# Patient Record
Sex: FEMALE | Race: BLACK OR AFRICAN AMERICAN | NOT HISPANIC OR LATINO | URBAN - METROPOLITAN AREA
[De-identification: names, ages, dates, MRNs, and addresses within clinical notes are randomized per-mention and may not be internally consistent; named-entity substitution may affect disease eponyms.]

---

## 2024-10-25 ENCOUNTER — INPATIENT (INPATIENT)
Facility: HOSPITAL | Age: 34
LOS: 4 days | Discharge: HOME HEALTH | End: 2024-10-30
Attending: STUDENT IN AN ORGANIZED HEALTH CARE EDUCATION/TRAINING PROGRAM | Admitting: STUDENT IN AN ORGANIZED HEALTH CARE EDUCATION/TRAINING PROGRAM
Payer: COMMERCIAL

## 2024-10-25 VITALS
OXYGEN SATURATION: 95 % | SYSTOLIC BLOOD PRESSURE: 117 MMHG | HEIGHT: 64 IN | HEART RATE: 103 BPM | RESPIRATION RATE: 20 BRPM | WEIGHT: 220.02 LBS | DIASTOLIC BLOOD PRESSURE: 85 MMHG | TEMPERATURE: 99 F

## 2024-10-25 DIAGNOSIS — J18.9 PNEUMONIA, UNSPECIFIED ORGANISM: ICD-10-CM

## 2024-10-25 DIAGNOSIS — E66.812 OBESITY, CLASS 2: ICD-10-CM

## 2024-10-25 DIAGNOSIS — J96.01 ACUTE RESPIRATORY FAILURE WITH HYPOXIA: ICD-10-CM

## 2024-10-25 LAB
ALBUMIN SERPL ELPH-MCNC: 3.2 G/DL — LOW (ref 3.3–5)
ALP SERPL-CCNC: 66 U/L — SIGNIFICANT CHANGE UP (ref 40–120)
ALT FLD-CCNC: 16 U/L — SIGNIFICANT CHANGE UP (ref 12–78)
ANION GAP SERPL CALC-SCNC: 8 MMOL/L — SIGNIFICANT CHANGE UP (ref 5–17)
ANISOCYTOSIS BLD QL: SLIGHT — SIGNIFICANT CHANGE UP
APTT BLD: 24.9 SEC — SIGNIFICANT CHANGE UP (ref 24.5–35.6)
AST SERPL-CCNC: 25 U/L — SIGNIFICANT CHANGE UP (ref 15–37)
BASOPHILS # BLD AUTO: 0.07 K/UL — SIGNIFICANT CHANGE UP (ref 0–0.2)
BASOPHILS NFR BLD AUTO: 0.6 % — SIGNIFICANT CHANGE UP (ref 0–2)
BILIRUB SERPL-MCNC: 0.4 MG/DL — SIGNIFICANT CHANGE UP (ref 0.2–1.2)
BUN SERPL-MCNC: 6 MG/DL — LOW (ref 7–23)
CALCIUM SERPL-MCNC: 9 MG/DL — SIGNIFICANT CHANGE UP (ref 8.5–10.1)
CHLORIDE SERPL-SCNC: 109 MMOL/L — HIGH (ref 96–108)
CO2 SERPL-SCNC: 22 MMOL/L — SIGNIFICANT CHANGE UP (ref 22–31)
CREAT SERPL-MCNC: 0.72 MG/DL — SIGNIFICANT CHANGE UP (ref 0.5–1.3)
D DIMER BLD IA.RAPID-MCNC: 339 NG/ML DDU — HIGH
EGFR: 111 ML/MIN/1.73M2 — SIGNIFICANT CHANGE UP
EOSINOPHIL # BLD AUTO: 0.16 K/UL — SIGNIFICANT CHANGE UP (ref 0–0.5)
EOSINOPHIL NFR BLD AUTO: 1.4 % — SIGNIFICANT CHANGE UP (ref 0–6)
FLUAV AG NPH QL: SIGNIFICANT CHANGE UP
FLUBV AG NPH QL: SIGNIFICANT CHANGE UP
GIANT PLATELETS BLD QL SMEAR: PRESENT — SIGNIFICANT CHANGE UP
GLUCOSE SERPL-MCNC: 97 MG/DL — SIGNIFICANT CHANGE UP (ref 70–99)
HCG SERPL-ACNC: <1 MIU/ML — SIGNIFICANT CHANGE UP
HCT VFR BLD CALC: 41.3 % — SIGNIFICANT CHANGE UP (ref 34.5–45)
HGB BLD-MCNC: 14.3 G/DL — SIGNIFICANT CHANGE UP (ref 11.5–15.5)
IMM GRANULOCYTES NFR BLD AUTO: 0.5 % — SIGNIFICANT CHANGE UP (ref 0–0.9)
INR BLD: 1.1 RATIO — SIGNIFICANT CHANGE UP (ref 0.85–1.16)
LG PLATELETS BLD QL AUTO: SLIGHT — SIGNIFICANT CHANGE UP
LYMPHOCYTES # BLD AUTO: 2.97 K/UL — SIGNIFICANT CHANGE UP (ref 1–3.3)
LYMPHOCYTES # BLD AUTO: 26.5 % — SIGNIFICANT CHANGE UP (ref 13–44)
MANUAL SMEAR VERIFICATION: SIGNIFICANT CHANGE UP
MCHC RBC-ENTMCNC: 23.6 PG — LOW (ref 27–34)
MCHC RBC-ENTMCNC: 34.6 G/DL — SIGNIFICANT CHANGE UP (ref 32–36)
MCV RBC AUTO: 68 FL — LOW (ref 80–100)
MICROCYTES BLD QL: SIGNIFICANT CHANGE UP
MONOCYTES # BLD AUTO: 0.87 K/UL — SIGNIFICANT CHANGE UP (ref 0–0.9)
MONOCYTES NFR BLD AUTO: 7.8 % — SIGNIFICANT CHANGE UP (ref 2–14)
NEUTROPHILS # BLD AUTO: 7.07 K/UL — SIGNIFICANT CHANGE UP (ref 1.8–7.4)
NEUTROPHILS NFR BLD AUTO: 63.2 % — SIGNIFICANT CHANGE UP (ref 43–77)
NRBC # BLD: 0 /100 WBCS — SIGNIFICANT CHANGE UP (ref 0–0)
NT-PROBNP SERPL-SCNC: 44 PG/ML — SIGNIFICANT CHANGE UP (ref 0–125)
PLAT MORPH BLD: ABNORMAL
PLATELET # BLD AUTO: 346 K/UL — SIGNIFICANT CHANGE UP (ref 150–400)
POTASSIUM SERPL-MCNC: 3.9 MMOL/L — SIGNIFICANT CHANGE UP (ref 3.5–5.3)
POTASSIUM SERPL-SCNC: 3.9 MMOL/L — SIGNIFICANT CHANGE UP (ref 3.5–5.3)
PROT SERPL-MCNC: 7.9 GM/DL — SIGNIFICANT CHANGE UP (ref 6–8.3)
PROTHROM AB SERPL-ACNC: 12.7 SEC — SIGNIFICANT CHANGE UP (ref 9.9–13.4)
RAPID RVP RESULT: SIGNIFICANT CHANGE UP
RBC # BLD: 6.07 M/UL — HIGH (ref 3.8–5.2)
RBC # FLD: 14.6 % — HIGH (ref 10.3–14.5)
RBC BLD AUTO: ABNORMAL
SARS-COV-2 RNA SPEC QL NAA+PROBE: SIGNIFICANT CHANGE UP
SARS-COV-2 RNA SPEC QL NAA+PROBE: SIGNIFICANT CHANGE UP
SODIUM SERPL-SCNC: 139 MMOL/L — SIGNIFICANT CHANGE UP (ref 135–145)
TROPONIN I, HIGH SENSITIVITY RESULT: 3 NG/L — SIGNIFICANT CHANGE UP
WBC # BLD: 11.2 K/UL — HIGH (ref 3.8–10.5)
WBC # FLD AUTO: 11.2 K/UL — HIGH (ref 3.8–10.5)

## 2024-10-25 PROCEDURE — 99223 1ST HOSP IP/OBS HIGH 75: CPT

## 2024-10-25 PROCEDURE — 71046 X-RAY EXAM CHEST 2 VIEWS: CPT | Mod: 26

## 2024-10-25 PROCEDURE — 71275 CT ANGIOGRAPHY CHEST: CPT | Mod: 26,MC

## 2024-10-25 PROCEDURE — 99285 EMERGENCY DEPT VISIT HI MDM: CPT

## 2024-10-25 PROCEDURE — 93010 ELECTROCARDIOGRAM REPORT: CPT

## 2024-10-25 RX ORDER — ACETAMINOPHEN 500 MG
1000 TABLET ORAL ONCE
Refills: 0 | Status: COMPLETED | OUTPATIENT
Start: 2024-10-25 | End: 2024-10-25

## 2024-10-25 RX ORDER — AZITHROMYCIN DIHYDRATE 200 MG/5ML
500 POWDER, FOR SUSPENSION ORAL ONCE
Refills: 0 | Status: DISCONTINUED | OUTPATIENT
Start: 2024-10-25 | End: 2024-10-25

## 2024-10-25 RX ORDER — DOXYCYCLINE HYCLATE 100 MG/1
100 TABLET, FILM COATED ORAL EVERY 12 HOURS
Refills: 0 | Status: DISCONTINUED | OUTPATIENT
Start: 2024-10-26 | End: 2024-10-28

## 2024-10-25 RX ORDER — ACETAMINOPHEN 500 MG
650 TABLET ORAL EVERY 6 HOURS
Refills: 0 | Status: DISCONTINUED | OUTPATIENT
Start: 2024-10-25 | End: 2024-10-30

## 2024-10-25 RX ORDER — MELATONIN 5 MG
3 TABLET ORAL AT BEDTIME
Refills: 0 | Status: DISCONTINUED | OUTPATIENT
Start: 2024-10-25 | End: 2024-10-30

## 2024-10-25 RX ORDER — ENOXAPARIN SODIUM 40MG/0.4ML
40 SYRINGE (ML) SUBCUTANEOUS EVERY 24 HOURS
Refills: 0 | Status: DISCONTINUED | OUTPATIENT
Start: 2024-10-25 | End: 2024-10-29

## 2024-10-25 RX ORDER — CEFTRIAXONE SODIUM 10 G
1000 VIAL (EA) INJECTION ONCE
Refills: 0 | Status: COMPLETED | OUTPATIENT
Start: 2024-10-25 | End: 2024-10-25

## 2024-10-25 RX ORDER — IPRATROPIUM BROMIDE AND ALBUTEROL SULFATE .5; 2.5 MG/3ML; MG/3ML
3 SOLUTION RESPIRATORY (INHALATION) EVERY 6 HOURS
Refills: 0 | Status: DISCONTINUED | OUTPATIENT
Start: 2024-10-25 | End: 2024-10-30

## 2024-10-25 RX ORDER — AZITHROMYCIN DIHYDRATE 200 MG/5ML
500 POWDER, FOR SUSPENSION ORAL ONCE
Refills: 0 | Status: COMPLETED | OUTPATIENT
Start: 2024-10-25 | End: 2024-10-25

## 2024-10-25 RX ORDER — DOXYCYCLINE HYCLATE 100 MG/1
100 TABLET, FILM COATED ORAL ONCE
Refills: 0 | Status: COMPLETED | OUTPATIENT
Start: 2024-10-25 | End: 2024-10-25

## 2024-10-25 RX ORDER — CEFTRIAXONE SODIUM 10 G
1000 VIAL (EA) INJECTION EVERY 24 HOURS
Refills: 0 | Status: DISCONTINUED | OUTPATIENT
Start: 2024-10-26 | End: 2024-10-30

## 2024-10-25 RX ORDER — DOXYCYCLINE HYCLATE 100 MG/1
TABLET, FILM COATED ORAL
Refills: 0 | Status: DISCONTINUED | OUTPATIENT
Start: 2024-10-25 | End: 2024-10-28

## 2024-10-25 RX ADMIN — Medication 1000 MILLIGRAM(S): at 18:15

## 2024-10-25 RX ADMIN — Medication 1000 MILLIGRAM(S): at 17:09

## 2024-10-25 RX ADMIN — AZITHROMYCIN DIHYDRATE 255 MILLIGRAM(S): 200 POWDER, FOR SUSPENSION ORAL at 16:57

## 2024-10-25 RX ADMIN — Medication 40 MILLIGRAM(S): at 23:17

## 2024-10-25 RX ADMIN — Medication 100 MILLIGRAM(S): at 16:21

## 2024-10-25 RX ADMIN — Medication 650 MILLIGRAM(S): at 22:10

## 2024-10-25 RX ADMIN — Medication 650 MILLIGRAM(S): at 21:40

## 2024-10-25 RX ADMIN — DOXYCYCLINE HYCLATE 100 MILLIGRAM(S): 100 TABLET, FILM COATED ORAL at 19:17

## 2024-10-25 RX ADMIN — Medication 400 MILLIGRAM(S): at 13:59

## 2024-10-25 NOTE — ED ADULT TRIAGE NOTE - CHIEF COMPLAINT QUOTE
pt c/o chest pain with difficulty breathing x 1 week. dyspnea on exertion. + sore throat seen by pmd and urgent care with negative results. denies fever, chills, body aches. given rx: doxycycline 100mg and amoxicillin 500 yesterday with no relief. denies pmh. o2 sat 89% on room air in triage placed on 022LNC increased to 95%.

## 2024-10-25 NOTE — CHART NOTE - NSCHARTNOTEFT_GEN_A_CORE
patient with burning sensation in left ac heplock while receiving Zithromax infusion   no dyspnea , chest tightness, sensation of airway fullness , difficulty swallowing, pruritus , rash , fever ,diaphoresis   infusion held and line flushed x 2 with ns   burning stopped after infusion held and did not reoccur with ns flushes of infusion  of doxycycline   heplock removed and re-sited to right arm   will discontinue Zithromax and start doxycyline

## 2024-10-25 NOTE — H&P ADULT - PROBLEM SELECTOR PLAN 2
SOB, hypoxia  CTA chest  ( I personally review) with no PE. Bilateral widespread GGO and airspace opacification  No recent hospitalization or antibiotics use  Continue with ceftriaxone and azithromycin  follow up blood culture result  Check RVP, urine legionella/strep pneumo Ag, mycoplasma IgM  closely monitor hemodynamic and respiratory status

## 2024-10-25 NOTE — H&P ADULT - NSHPPHYSICALEXAM_GEN_ALL_CORE
CONSTITUTIONAL: alert and cooperative, mild respiratory distress  EYES: PERRL, no scleral icterus  ENT: Mucosa moist, tongue normal  NECK: Neck supple, trachea midline, non-tender  CARDIAC: Normal S1 and S2. Regular rate and rhythms. No Pedal edema  LUNGS: Equal air entry both lungs. Bilateral rales +. Increase respiratory effort.   ABDOMEN: Soft, nondistended, nontender. No guarding or rebound tenderness. No hepatomegaly or splenomegaly. Bowel sound normal.   MUSCULOSKELETAL: Normocephalic, atraumatic. No significant deformity or joint abnormality  NEUROLOGICAL: No gross motor or sensory deficits  SKIN: no lesions or eruptions. Normal turgor  PSYCHIATRIC: A&O x 3, appropriate mood and affect

## 2024-10-25 NOTE — ED ADULT NURSE REASSESSMENT NOTE - NS ED NURSE REASSESS COMMENT FT1
Patient noted with a burning sensation at IV site, as per pt stated that it started when the azithromycin IV started same stopped. ED MD came in bedside no swelling to site IV flushing without difficulties noted with blood return and no pain while flushing with NS. Inpatient PA page recommendations made to d/c azithromycin.

## 2024-10-25 NOTE — PATIENT PROFILE ADULT - FALL HARM RISK - HARM RISK INTERVENTIONS
Assistance with ambulation/Assistance OOB with selected safe patient handling equipment/Communicate Risk of Fall with Harm to all staff/Discuss with provider need for PT consult/Monitor gait and stability/Reinforce activity limits and safety measures with patient and family/Tailored Fall Risk Interventions/Visual Cue: Yellow wristband and red socks/Bed in lowest position, wheels locked, appropriate side rails in place/Call bell, personal items and telephone in reach/Instruct patient to call for assistance before getting out of bed or chair/Non-slip footwear when patient is out of bed/[place] to call system/Physically safe environment - no spills, clutter or unnecessary equipment/Purposeful Proactive Rounding/Room/bathroom lighting operational, light cord in reach

## 2024-10-25 NOTE — ED ADULT NURSE NOTE - OBJECTIVE STATEMENT
Patient alert and verbally responsive, came in due to chest pains  with difficulty breathing x 1 week with a  sore throat. Went to urgent care and was  given doxycycline 100mg and amoxicillin 500 yesterday with no relief. denies pmh.

## 2024-10-25 NOTE — ED PROVIDER NOTE - CARE PLAN
1 Principal Discharge DX:	Dyspnea   Principal Discharge DX:	Multifocal pneumonia  Secondary Diagnosis:	Hypoxia

## 2024-10-25 NOTE — ED PROVIDER NOTE - ATTENDING APP SHARED VISIT CONTRIBUTION OF CARE
33yo female with no pmh presenting with worsening sob over past 5 days.  Saw doctor tuesday who prescribed abx for possible pna.  Patient with worsening exertional dyspnea over past few days associated with chest pain/ tightness worse with inspiration.  No fever.  + Dry cough.  Not worse with lying flat.  No edema.  No ocp use.  No hx dvt/ pe, early cardiac dz in family.  ECG sinus tachy.  Will eval pna, pe, viral syndrome, acs, chf.  Plan labs, xr, reassess.  Currently 86% on RA, will need admission.

## 2024-10-25 NOTE — ED ADULT NURSE REASSESSMENT NOTE - NS ED NURSE REASSESS COMMENT FT1
Received report from ANTONY Solano. Identified pt and introduced myself as RN. Pt is a&ox3. Cardiac monitor maintained. Nasal cannula in place at 4L. IV access is patent. Safety and fall precautions in place.

## 2024-10-25 NOTE — ED PROVIDER NOTE - PHYSICAL EXAMINATION
GEN: Awake, alert, interactive, NAD.  HEAD AND NECK: NC/AT. Airway patent. Neck supple.   EYES:  Clear b/l.    ENT: Moist mucus membranes.   CARDIAC: Regular rate, regular rhythm. No evident pedal edema.    RESP/CHEST: Normal respiratory effort with no use of accessory muscles or retractions. Clear throughout on auscultation.  ABD: soft, non-distended, non-tender. No rebound, no guarding.   BACK: No midline spinal TTP. No CVAT.   EXTREMITIES: Moving all extremities with no apparent deformities. No calf tenderness.   SKIN: Warm, dry, intact normal color. No rash.   NEURO: AOx3, CN II-XII grossly intact, no focal deficits.   PSYCH: Appropriate mood and affect.

## 2024-10-25 NOTE — H&P ADULT - ASSESSMENT
34 years old female with no significant medical history present to ED with complain of progressively worsened SOB for 5 days. Reported sore throat and chest congestion. No  fever or chills. No known sick contact.   Tachycardic, hypoxic to 89% at RA, improved with NC. WBC 11.2, plt 346, Cr 0.72, hsTnT 3, proBNP 44. Flu/COVID negative. CTA chest with no PE. Bilateral widespread GGO and airspace opacification

## 2024-10-25 NOTE — H&P ADULT - HISTORY OF PRESENT ILLNESS
34 years old female with no significant medical history present to ED with complain of progressively worsened SOB for 5 days. Reported sore throat and chest congestion. No fever or chills. No known sick contact.   Tachycardic, hypoxic to 89% at RA, improved with NC. WBC 11.2, plt 346, Cr 0.72, hsTnT 3, proBNP 44. Flu/COVID negative. CTA chest with no PE. Bilateral widespread GGO and airspace opacitifation.     SH: no toxic habits

## 2024-10-25 NOTE — H&P ADULT - PROBLEM SELECTOR PLAN 1
present with worsening SOB  Hypoxic to 89% at RA, improved with NC  leukocytosis +, negative flu/COVID. Normal proBNP  CTA chest  ( I personally review) with no PE. Bilateral widespread GGO and airspace opacification  due to pneumonia  Continue ceftriaxone and azithromycin  Closely monitor respiratory status, wean off NC as tolerate  duoneb prn

## 2024-10-25 NOTE — ED PROVIDER NOTE - CLINICAL SUMMARY MEDICAL DECISION MAKING FREE TEXT BOX
33 y/o female with no PMH here with difficulty breathing x 5 days with chest pain when taking deep breath. Noted to be hypoxic in triage.   Ddx include but not limited to acs, PE, ptx, PNA.   Will obtain cardiac labs including d-dimer, cxr, ekg, flu/covid. 35 y/o female with no PMH here with difficulty breathing x 5 days with chest pain when taking deep breath. Noted to be hypoxic in triage.   Ddx include but not limited to acs, PE, ptx, PNA.   Will obtain cardiac labs including d-dimer, cxr, ekg, flu/covid.    labs reviewed d-dimer mildly elevated 339. Will obtain cta r/o PE. Flu/covid negative.   CTA chest: NO PE. Low lung volumes with extensive bilateral groundglass and airspace   opacification may represent pulmonary edema and/or infection.  Scattered cystic areas throughout the pulmonary parenchyma    Will treat with rocephin and azithromycin for pna. Given pt is hypoxic 86 on RA will admit to the hospital.

## 2024-10-25 NOTE — ED ADULT TRIAGE NOTE - TEMP AT ED ARRIVAL (C)
Left msg on voicemail that prescription has been sent to pharmacy however she is due for follow up office visit and to call to schedule. 35

## 2024-10-25 NOTE — ED PROVIDER NOTE - OBJECTIVE STATEMENT
35 y/o female with no PMH here with difficulty breathing x 5 days. Pt reports having shortness of breath and chest pain when taking a deep breath. Dyspnea is worse with exertion. Pt saw PMD 3 days ago and had negative chest xray and was given abx which she took with no relief. Denies fever, chills. Reports mild dry cough. Denies nausea, vomiting, abdominal pain, LE swelling, LE pain,  recent travel, sick contact, OCP use. Pt denies family history of heart disease or blood clots. Pt denies smoking, drinking, drugs.

## 2024-10-26 LAB
ALBUMIN SERPL ELPH-MCNC: 2.9 G/DL — LOW (ref 3.3–5)
ALP SERPL-CCNC: 64 U/L — SIGNIFICANT CHANGE UP (ref 40–120)
ALT FLD-CCNC: 15 U/L — SIGNIFICANT CHANGE UP (ref 12–78)
ANION GAP SERPL CALC-SCNC: 10 MMOL/L — SIGNIFICANT CHANGE UP (ref 5–17)
AST SERPL-CCNC: 27 U/L — SIGNIFICANT CHANGE UP (ref 15–37)
BILIRUB SERPL-MCNC: 0.4 MG/DL — SIGNIFICANT CHANGE UP (ref 0.2–1.2)
BUN SERPL-MCNC: 5 MG/DL — LOW (ref 7–23)
CALCIUM SERPL-MCNC: 8.9 MG/DL — SIGNIFICANT CHANGE UP (ref 8.5–10.1)
CHLORIDE SERPL-SCNC: 107 MMOL/L — SIGNIFICANT CHANGE UP (ref 96–108)
CO2 SERPL-SCNC: 23 MMOL/L — SIGNIFICANT CHANGE UP (ref 22–31)
CREAT SERPL-MCNC: 0.61 MG/DL — SIGNIFICANT CHANGE UP (ref 0.5–1.3)
EGFR: 119 ML/MIN/1.73M2 — SIGNIFICANT CHANGE UP
GLUCOSE SERPL-MCNC: 91 MG/DL — SIGNIFICANT CHANGE UP (ref 70–99)
HCT VFR BLD CALC: 40 % — SIGNIFICANT CHANGE UP (ref 34.5–45)
HGB BLD-MCNC: 13.7 G/DL — SIGNIFICANT CHANGE UP (ref 11.5–15.5)
MAGNESIUM SERPL-MCNC: 2 MG/DL — SIGNIFICANT CHANGE UP (ref 1.6–2.6)
MCHC RBC-ENTMCNC: 23.6 PG — LOW (ref 27–34)
MCHC RBC-ENTMCNC: 34.3 G/DL — SIGNIFICANT CHANGE UP (ref 32–36)
MCV RBC AUTO: 69 FL — LOW (ref 80–100)
NRBC # BLD: 0 /100 WBCS — SIGNIFICANT CHANGE UP (ref 0–0)
PHOSPHATE SERPL-MCNC: 3.5 MG/DL — SIGNIFICANT CHANGE UP (ref 2.5–4.5)
PLATELET # BLD AUTO: 323 K/UL — SIGNIFICANT CHANGE UP (ref 150–400)
POTASSIUM SERPL-MCNC: 3.7 MMOL/L — SIGNIFICANT CHANGE UP (ref 3.5–5.3)
POTASSIUM SERPL-SCNC: 3.7 MMOL/L — SIGNIFICANT CHANGE UP (ref 3.5–5.3)
PROT SERPL-MCNC: 7.2 GM/DL — SIGNIFICANT CHANGE UP (ref 6–8.3)
RBC # BLD: 5.8 M/UL — HIGH (ref 3.8–5.2)
RBC # FLD: 14.4 % — SIGNIFICANT CHANGE UP (ref 10.3–14.5)
SODIUM SERPL-SCNC: 140 MMOL/L — SIGNIFICANT CHANGE UP (ref 135–145)
WBC # BLD: 9.81 K/UL — SIGNIFICANT CHANGE UP (ref 3.8–10.5)
WBC # FLD AUTO: 9.81 K/UL — SIGNIFICANT CHANGE UP (ref 3.8–10.5)

## 2024-10-26 PROCEDURE — 99232 SBSQ HOSP IP/OBS MODERATE 35: CPT

## 2024-10-26 RX ADMIN — DOXYCYCLINE HYCLATE 100 MILLIGRAM(S): 100 TABLET, FILM COATED ORAL at 05:06

## 2024-10-26 RX ADMIN — DOXYCYCLINE HYCLATE 100 MILLIGRAM(S): 100 TABLET, FILM COATED ORAL at 17:48

## 2024-10-26 RX ADMIN — Medication 100 MILLIGRAM(S): at 15:16

## 2024-10-26 RX ADMIN — Medication 40 MILLIGRAM(S): at 23:09

## 2024-10-26 NOTE — PROGRESS NOTE ADULT - SUBJECTIVE AND OBJECTIVE BOX
34 years old female with morbid obesity (BMI 37.8) presented w/ progressively worsened SOB and was admitted w/ acute hypoxic respiratory failure due to multifocal PNA w/ sepsis POA. She is lying in bed in NAD.    MEDICATIONS  (STANDING):  cefTRIAXone   IVPB 1000 milliGRAM(s) IV Intermittent every 24 hours  doxycycline IVPB      doxycycline IVPB 100 milliGRAM(s) IV Intermittent every 12 hours  enoxaparin Injectable 40 milliGRAM(s) SubCutaneous every 24 hours    MEDICATIONS  (PRN):  acetaminophen     Tablet .. 650 milliGRAM(s) Oral every 6 hours PRN Temp greater or equal to 38C (100.4F), Mild Pain (1 - 3), Moderate Pain (4 - 6)  albuterol/ipratropium for Nebulization 3 milliLiter(s) Nebulizer every 6 hours PRN Shortness of Breath and/or Wheezing  melatonin 3 milliGRAM(s) Oral at bedtime PRN Insomnia      Allergies    No Known Allergies    Intolerances    Zithromax IV (Other (Mild))      Vital Signs Last 24 Hrs  T(C): 36.7 (26 Oct 2024 16:14), Max: 36.9 (25 Oct 2024 20:14)  T(F): 98.1 (26 Oct 2024 16:14), Max: 98.4 (25 Oct 2024 20:14)  HR: 79 (26 Oct 2024 16:14) (79 - 84)  BP: 135/74 (26 Oct 2024 16:14) (101/64 - 135/74)  BP(mean): 95 (26 Oct 2024 16:14) (95 - 95)  RR: 19 (26 Oct 2024 16:14) (19 - 22)  SpO2: 93% (26 Oct 2024 16:14) (93% - 95%)    Parameters below as of 26 Oct 2024 15:52  Patient On (Oxygen Delivery Method): nasal cannula  O2 Flow (L/min): 4      PHYSICAL EXAM:  GENERAL: NAD, well-groomed, well-developed  HEAD:  Atraumatic, Normocephalic  EYES: EOMI, PERRLA, conjunctiva and sclera clear  ENMT: No tonsillar erythema, exudates, or enlargement; Moist mucous membranes, Good dentition, No lesions  NECK: Supple, No JVD, Normal thyroid  NERVOUS SYSTEM:  Alert & Oriented X3, Good concentration; Motor Strength 5/5 B/L upper and lower extremities; DTRs 2+ intact and symmetric  CHEST/LUNG: Clear to auscultation bilaterally; No rales, rhonchi, wheezing, or rubs  HEART: Regular rate and rhythm; No murmurs, rubs, or gallops  ABDOMEN: Soft, Nontender, Nondistended; Bowel sounds present  EXTREMITIES:  2+ Peripheral Pulses, No clubbing, cyanosis, or edema  LYMPH: No lymphadenopathy noted  SKIN: No rashes or lesions    LABS:                        13.7   9.81  )-----------( 323      ( 26 Oct 2024 06:40 )             40.0     10-26    140  |  107  |  5[L]  ----------------------------<  91  3.7   |  23  |  0.61    Ca    8.9      26 Oct 2024 06:40  Phos  3.5     10-26  Mg     2.0     10-26    TPro  7.2  /  Alb  2.9[L]  /  TBili  0.4  /  DBili  x   /  AST  27  /  ALT  15  /  AlkPhos  64  10-26    PT/INR - ( 25 Oct 2024 12:54 )   PT: 12.7 sec;   INR: 1.10 ratio         PTT - ( 25 Oct 2024 12:54 )  PTT:24.9 sec  Urinalysis Basic - ( 26 Oct 2024 06:40 )    Color: x / Appearance: x / SG: x / pH: x  Gluc: 91 mg/dL / Ketone: x  / Bili: x / Urobili: x   Blood: x / Protein: x / Nitrite: x   Leuk Esterase: x / RBC: x / WBC x   Sq Epi: x / Non Sq Epi: x / Bacteria: x       RADIOLOGY & ADDITIONAL TESTS:    10-26-24 @ 07:01  -  10-26-24 @ 19:54  --------------------------------------------------------  IN:    Oral Fluid: 320 mL  Total IN: 320 mL    OUT:  Total OUT: 0 mL    Total NET: 320 mL

## 2024-10-26 NOTE — PROGRESS NOTE ADULT - ASSESSMENT
34 years old female with morbid obesity (BMI 37.8) presented w/ progressively worsened SOB and was admitted w/ acute hypoxic respiratory failure due to multifocal PNA w/ sepsis POA.      Bilateral pneumonia.   - CTA chest  showed bilateral widespread GGO and airspace opacification  - c/w Ceftriaxone and Doxy  - follow up blood culture   - Check RVP, urine legionella/strep pneumo Ag, mycoplasma IgM  - c/w Duoneb PRN and wean off O2    Prophylaxis:  DVT: Lovenox  GI: PO diet

## 2024-10-27 LAB
ANION GAP SERPL CALC-SCNC: 9 MMOL/L — SIGNIFICANT CHANGE UP (ref 5–17)
BUN SERPL-MCNC: 9 MG/DL — SIGNIFICANT CHANGE UP (ref 7–23)
CALCIUM SERPL-MCNC: 9.3 MG/DL — SIGNIFICANT CHANGE UP (ref 8.5–10.1)
CHLORIDE SERPL-SCNC: 106 MMOL/L — SIGNIFICANT CHANGE UP (ref 96–108)
CO2 SERPL-SCNC: 22 MMOL/L — SIGNIFICANT CHANGE UP (ref 22–31)
CREAT SERPL-MCNC: 0.71 MG/DL — SIGNIFICANT CHANGE UP (ref 0.5–1.3)
EGFR: 113 ML/MIN/1.73M2 — SIGNIFICANT CHANGE UP
GLUCOSE SERPL-MCNC: 117 MG/DL — HIGH (ref 70–99)
HCT VFR BLD CALC: 43.8 % — SIGNIFICANT CHANGE UP (ref 34.5–45)
HGB BLD-MCNC: 14.9 G/DL — SIGNIFICANT CHANGE UP (ref 11.5–15.5)
LEGIONELLA AG UR QL: NEGATIVE — SIGNIFICANT CHANGE UP
MAGNESIUM SERPL-MCNC: 2 MG/DL — SIGNIFICANT CHANGE UP (ref 1.6–2.6)
MCHC RBC-ENTMCNC: 23.3 PG — LOW (ref 27–34)
MCHC RBC-ENTMCNC: 34 G/DL — SIGNIFICANT CHANGE UP (ref 32–36)
MCV RBC AUTO: 68.4 FL — LOW (ref 80–100)
NRBC # BLD: 0 /100 WBCS — SIGNIFICANT CHANGE UP (ref 0–0)
PHOSPHATE SERPL-MCNC: 3.8 MG/DL — SIGNIFICANT CHANGE UP (ref 2.5–4.5)
PLATELET # BLD AUTO: 370 K/UL — SIGNIFICANT CHANGE UP (ref 150–400)
POTASSIUM SERPL-MCNC: 4 MMOL/L — SIGNIFICANT CHANGE UP (ref 3.5–5.3)
POTASSIUM SERPL-SCNC: 4 MMOL/L — SIGNIFICANT CHANGE UP (ref 3.5–5.3)
RBC # BLD: 6.4 M/UL — HIGH (ref 3.8–5.2)
RBC # FLD: 14.6 % — HIGH (ref 10.3–14.5)
S PNEUM AG UR QL: NEGATIVE — SIGNIFICANT CHANGE UP
SODIUM SERPL-SCNC: 137 MMOL/L — SIGNIFICANT CHANGE UP (ref 135–145)
WBC # BLD: 11.79 K/UL — HIGH (ref 3.8–10.5)
WBC # FLD AUTO: 11.79 K/UL — HIGH (ref 3.8–10.5)

## 2024-10-27 PROCEDURE — 99232 SBSQ HOSP IP/OBS MODERATE 35: CPT

## 2024-10-27 RX ADMIN — Medication 40 MILLIGRAM(S): at 22:33

## 2024-10-27 RX ADMIN — DOXYCYCLINE HYCLATE 100 MILLIGRAM(S): 100 TABLET, FILM COATED ORAL at 05:38

## 2024-10-27 RX ADMIN — Medication 650 MILLIGRAM(S): at 19:42

## 2024-10-27 RX ADMIN — IPRATROPIUM BROMIDE AND ALBUTEROL SULFATE 3 MILLILITER(S): .5; 2.5 SOLUTION RESPIRATORY (INHALATION) at 22:33

## 2024-10-27 RX ADMIN — Medication 100 MILLIGRAM(S): at 15:23

## 2024-10-27 RX ADMIN — Medication 650 MILLIGRAM(S): at 20:30

## 2024-10-27 RX ADMIN — Medication 3 MILLIGRAM(S): at 22:33

## 2024-10-27 RX ADMIN — DOXYCYCLINE HYCLATE 100 MILLIGRAM(S): 100 TABLET, FILM COATED ORAL at 17:57

## 2024-10-27 NOTE — PROGRESS NOTE ADULT - ASSESSMENT
34 years old female with morbid obesity (BMI 37.8) presented w/ progressively worsened SOB and was admitted w/ acute hypoxic respiratory failure due to multifocal PNA w/ sepsis POA.      Bilateral pneumonia.   - CTA chest  showed bilateral widespread GGO and airspace opacification  - c/w Ceftriaxone and Doxy  - NGTD on  blood culture   - RVP negative, strep pneumo Ag negative  - follow up urine legionella & mycoplasma IgM  - c/w Duoneb PRN and wean off O2    Prophylaxis:  DVT: Lovenox  GI: PO diet

## 2024-10-27 NOTE — PROGRESS NOTE ADULT - SUBJECTIVE AND OBJECTIVE BOX
34 years old female with morbid obesity (BMI 37.8) presented w/ progressively worsened SOB and was admitted w/ acute hypoxic respiratory failure due to multifocal PNA w/ sepsis POA. She is lying in bed in NAD.     MEDICATIONS  (STANDING):  cefTRIAXone   IVPB 1000 milliGRAM(s) IV Intermittent every 24 hours  doxycycline IVPB 100 milliGRAM(s) IV Intermittent every 12 hours  doxycycline IVPB      enoxaparin Injectable 40 milliGRAM(s) SubCutaneous every 24 hours    MEDICATIONS  (PRN):  acetaminophen     Tablet .. 650 milliGRAM(s) Oral every 6 hours PRN Temp greater or equal to 38C (100.4F), Mild Pain (1 - 3), Moderate Pain (4 - 6)  albuterol/ipratropium for Nebulization 3 milliLiter(s) Nebulizer every 6 hours PRN Shortness of Breath and/or Wheezing  melatonin 3 milliGRAM(s) Oral at bedtime PRN Insomnia      Allergies    No Known Allergies    Intolerances    Zithromax IV (Other (Mild))      Vital Signs Last 24 Hrs  T(C): 36.6 (27 Oct 2024 17:30), Max: 36.8 (26 Oct 2024 23:34)  T(F): 97.9 (27 Oct 2024 17:30), Max: 98.3 (26 Oct 2024 23:34)  HR: 74 (27 Oct 2024 17:30) (74 - 85)  BP: 107/75 (27 Oct 2024 17:30) (107/75 - 126/84)  BP(mean): --  RR: 18 (27 Oct 2024 17:30) (18 - 19)  SpO2: 92% (27 Oct 2024 17:30) (91% - 97%)    Parameters below as of 27 Oct 2024 17:30  Patient On (Oxygen Delivery Method): nasal cannula  O2 Flow (L/min): 4      PHYSICAL EXAM:  GENERAL: NAD   HEAD:  Atraumatic, Normocephalic  EYES: EOMI, PERRLA   NECK: Supple   NERVOUS SYSTEM:  Alert    CHEST/LUNG: Clear to auscultation bilaterally; No rales, rhonchi, wheezing, or rubs  HEART: Regular rate and rhythm; No murmurs, rubs, or gallops  ABDOMEN: Soft, Nontender, Nondistended; Bowel sounds present  EXTREMITIES: No clubbing, cyanosis, or edema       LABS:                        14.9   11.79 )-----------( 370      ( 27 Oct 2024 05:45 )             43.8     10-27    137  |  106  |  9   ----------------------------<  117[H]  4.0   |  22  |  0.71    Ca    9.3      27 Oct 2024 05:45  Phos  3.8     10-27  Mg     2.0     10-27    TPro  7.2  /  Alb  2.9[L]  /  TBili  0.4  /  DBili  x   /  AST  27  /  ALT  15  /  AlkPhos  64  10-26      Urinalysis Basic - ( 27 Oct 2024 05:45 )    Color: x / Appearance: x / SG: x / pH: x  Gluc: 117 mg/dL / Ketone: x  / Bili: x / Urobili: x   Blood: x / Protein: x / Nitrite: x   Leuk Esterase: x / RBC: x / WBC x   Sq Epi: x / Non Sq Epi: x / Bacteria: x       Culture - Blood (collected 25 Oct 2024 15:56)  Source: .Blood BLOOD  Preliminary Report (26 Oct 2024 23:07):    No growth at 24 hours    Culture - Blood (collected 25 Oct 2024 15:56)  Source: .Blood BLOOD  Preliminary Report (26 Oct 2024 23:07):    No growth at 24 hours      RADIOLOGY & ADDITIONAL TESTS:    10-26-24 @ 07:01  -  10-27-24 @ 07:00  --------------------------------------------------------  IN:    Oral Fluid: 320 mL  Total IN: 320 mL    OUT:  Total OUT: 0 mL    Total NET: 320 mL      10-27-24 @ 07:01  -  10-27-24 @ 21:19  --------------------------------------------------------  IN:    IV PiggyBack: 150 mL    Oral Fluid: 420 mL  Total IN: 570 mL    OUT:  Total OUT: 0 mL    Total NET: 570 mL

## 2024-10-28 LAB
ANION GAP SERPL CALC-SCNC: 6 MMOL/L — SIGNIFICANT CHANGE UP (ref 5–17)
BUN SERPL-MCNC: 9 MG/DL — SIGNIFICANT CHANGE UP (ref 7–23)
CALCIUM SERPL-MCNC: 9.3 MG/DL — SIGNIFICANT CHANGE UP (ref 8.5–10.1)
CHLORIDE SERPL-SCNC: 105 MMOL/L — SIGNIFICANT CHANGE UP (ref 96–108)
CO2 SERPL-SCNC: 26 MMOL/L — SIGNIFICANT CHANGE UP (ref 22–31)
CREAT SERPL-MCNC: 0.71 MG/DL — SIGNIFICANT CHANGE UP (ref 0.5–1.3)
EGFR: 113 ML/MIN/1.73M2 — SIGNIFICANT CHANGE UP
GLUCOSE SERPL-MCNC: 100 MG/DL — HIGH (ref 70–99)
HCT VFR BLD CALC: 42.2 % — SIGNIFICANT CHANGE UP (ref 34.5–45)
HGB BLD-MCNC: 14.6 G/DL — SIGNIFICANT CHANGE UP (ref 11.5–15.5)
MAGNESIUM SERPL-MCNC: 2 MG/DL — SIGNIFICANT CHANGE UP (ref 1.6–2.6)
MCHC RBC-ENTMCNC: 23.5 PG — LOW (ref 27–34)
MCHC RBC-ENTMCNC: 34.6 G/DL — SIGNIFICANT CHANGE UP (ref 32–36)
MCV RBC AUTO: 68.1 FL — LOW (ref 80–100)
NRBC # BLD: 0 /100 WBCS — SIGNIFICANT CHANGE UP (ref 0–0)
PHOSPHATE SERPL-MCNC: 3.9 MG/DL — SIGNIFICANT CHANGE UP (ref 2.5–4.5)
PLATELET # BLD AUTO: 344 K/UL — SIGNIFICANT CHANGE UP (ref 150–400)
POTASSIUM SERPL-MCNC: 3.9 MMOL/L — SIGNIFICANT CHANGE UP (ref 3.5–5.3)
POTASSIUM SERPL-SCNC: 3.9 MMOL/L — SIGNIFICANT CHANGE UP (ref 3.5–5.3)
RBC # BLD: 6.2 M/UL — HIGH (ref 3.8–5.2)
RBC # FLD: 14.5 % — SIGNIFICANT CHANGE UP (ref 10.3–14.5)
SODIUM SERPL-SCNC: 137 MMOL/L — SIGNIFICANT CHANGE UP (ref 135–145)
WBC # BLD: 11.94 K/UL — HIGH (ref 3.8–10.5)
WBC # FLD AUTO: 11.94 K/UL — HIGH (ref 3.8–10.5)

## 2024-10-28 PROCEDURE — 36000 PLACE NEEDLE IN VEIN: CPT

## 2024-10-28 PROCEDURE — 99232 SBSQ HOSP IP/OBS MODERATE 35: CPT

## 2024-10-28 PROCEDURE — 76937 US GUIDE VASCULAR ACCESS: CPT | Mod: 26,59

## 2024-10-28 RX ORDER — DOXYCYCLINE HYCLATE 100 MG/1
100 TABLET, FILM COATED ORAL EVERY 12 HOURS
Refills: 0 | Status: DISCONTINUED | OUTPATIENT
Start: 2024-10-28 | End: 2024-10-29

## 2024-10-28 RX ADMIN — Medication 40 MILLIGRAM(S): at 23:27

## 2024-10-28 RX ADMIN — DOXYCYCLINE HYCLATE 100 MILLIGRAM(S): 100 TABLET, FILM COATED ORAL at 17:35

## 2024-10-28 RX ADMIN — DOXYCYCLINE HYCLATE 100 MILLIGRAM(S): 100 TABLET, FILM COATED ORAL at 05:55

## 2024-10-28 RX ADMIN — Medication 650 MILLIGRAM(S): at 19:51

## 2024-10-28 RX ADMIN — Medication 100 MILLIGRAM(S): at 16:08

## 2024-10-28 RX ADMIN — Medication 650 MILLIGRAM(S): at 20:45

## 2024-10-28 NOTE — PROGRESS NOTE ADULT - SUBJECTIVE AND OBJECTIVE BOX
34 years old female with morbid obesity (BMI 37.8) presented w/ progressively worsened SOB and was admitted w/ acute hypoxic respiratory failure due to multifocal PNA w/ sepsis POA. She is lying in bed in NAD.      MEDICATIONS  (STANDING):  cefTRIAXone   IVPB 1000 milliGRAM(s) IV Intermittent every 24 hours  doxycycline IVPB 100 milliGRAM(s) IV Intermittent every 12 hours  doxycycline IVPB      enoxaparin Injectable 40 milliGRAM(s) SubCutaneous every 24 hours    MEDICATIONS  (PRN):  acetaminophen     Tablet .. 650 milliGRAM(s) Oral every 6 hours PRN Temp greater or equal to 38C (100.4F), Mild Pain (1 - 3), Moderate Pain (4 - 6)  albuterol/ipratropium for Nebulization 3 milliLiter(s) Nebulizer every 6 hours PRN Shortness of Breath and/or Wheezing  melatonin 3 milliGRAM(s) Oral at bedtime PRN Insomnia      Allergies    No Known Allergies    Intolerances    Zithromax IV (Other (Mild))      Vital Signs Last 24 Hrs  T(C): 36.9 (28 Oct 2024 15:55), Max: 36.9 (28 Oct 2024 15:55)  T(F): 98.4 (28 Oct 2024 15:55), Max: 98.4 (28 Oct 2024 15:55)  HR: 88 (28 Oct 2024 15:55) (71 - 88)  BP: 109/78 (28 Oct 2024 15:55) (100/69 - 114/81)  BP(mean): --  RR: 18 (28 Oct 2024 15:55) (18 - 19)  SpO2: 92% (28 Oct 2024 15:55) (86% - 96%)    Parameters below as of 28 Oct 2024 14:15  Patient On (Oxygen Delivery Method): room air        PHYSICAL EXAM:  GENERAL: NAD   HEAD:  Atraumatic, Normocephalic  EYES: EOMI, PERRLA   NECK: Supple   NERVOUS SYSTEM:  Alert    CHEST/LUNG: Clear to auscultation bilaterally; No rales, rhonchi, wheezing, or rubs  HEART: Regular rate and rhythm; No murmurs, rubs, or gallops  ABDOMEN: Soft, Nontender, Nondistended; Bowel sounds present  EXTREMITIES: No clubbing, cyanosis, or edema    LABS:                        14.6   11.94 )-----------( 344      ( 28 Oct 2024 08:37 )             42.2     10-28    137  |  105  |  9   ----------------------------<  100[H]  3.9   |  26  |  0.71    Ca    9.3      28 Oct 2024 08:37  Phos  3.9     10-28  Mg     2.0     10-28        Urinalysis Basic - ( 28 Oct 2024 08:37 )    Color: x / Appearance: x / SG: x / pH: x  Gluc: 100 mg/dL / Ketone: x  / Bili: x / Urobili: x   Blood: x / Protein: x / Nitrite: x   Leuk Esterase: x / RBC: x / WBC x   Sq Epi: x / Non Sq Epi: x / Bacteria: x      CAPILLARY BLOOD GLUCOSE          Culture - Blood (collected 25 Oct 2024 15:56)  Source: .Blood BLOOD  Preliminary Report (27 Oct 2024 23:00):    No growth at 48 Hours    Culture - Blood (collected 25 Oct 2024 15:56)  Source: .Blood BLOOD  Preliminary Report (27 Oct 2024 23:00):    No growth at 48 Hours      RADIOLOGY & ADDITIONAL TESTS:    10-27-24 @ 07:01  -  10-28-24 @ 07:00  --------------------------------------------------------  IN:    IV PiggyBack: 250 mL    Oral Fluid: 420 mL  Total IN: 670 mL    OUT:  Total OUT: 0 mL    Total NET: 670 mL      10-28-24 @ 07:01  -  10-28-24 @ 19:02  --------------------------------------------------------  IN:    Oral Fluid: 420 mL  Total IN: 420 mL    OUT:  Total OUT: 0 mL    Total NET: 420 mL

## 2024-10-28 NOTE — PROCEDURE NOTE - ADDITIONAL PROCEDURE DETAILS
ill pt requiring PIV access for medical management. Under US guidance identified Right UE vein  and successfully placed 20g x 10cm powerglide into vessel.  Placement confirmed s/p with ultrasound and catheter determined to be in patent lumen of vein. Pt tolerated well w/o complication.

## 2024-10-28 NOTE — PROGRESS NOTE ADULT - ASSESSMENT
34 years old female with morbid obesity (BMI 37.8) presented w/ progressively worsened SOB and was admitted w/ acute hypoxic respiratory failure due to multifocal PNA w/ sepsis POA.      Bilateral pneumonia.   - CTA chest  showed bilateral widespread GGO and airspace opacification  - c/w Ceftriaxone and Doxy  - NGTD on blood culture   - RVP negative, strep pneumo Ag negative  - follow up urine legionella & mycoplasma IgM  - c/w Duoneb PRN and wean off O2  - wean off oxygen    Prophylaxis:  DVT: Lovenox  GI: PO diet

## 2024-10-29 LAB
ALBUMIN SERPL ELPH-MCNC: 3.5 G/DL — SIGNIFICANT CHANGE UP (ref 3.3–5)
ALP SERPL-CCNC: 74 U/L — SIGNIFICANT CHANGE UP (ref 40–120)
ALT FLD-CCNC: 22 U/L — SIGNIFICANT CHANGE UP (ref 12–78)
ANION GAP SERPL CALC-SCNC: 6 MMOL/L — SIGNIFICANT CHANGE UP (ref 5–17)
AST SERPL-CCNC: 23 U/L — SIGNIFICANT CHANGE UP (ref 15–37)
BILIRUB DIRECT SERPL-MCNC: 0.1 MG/DL — SIGNIFICANT CHANGE UP (ref 0–0.3)
BILIRUB INDIRECT FLD-MCNC: 0.5 MG/DL — SIGNIFICANT CHANGE UP (ref 0.2–1)
BILIRUB SERPL-MCNC: 0.6 MG/DL — SIGNIFICANT CHANGE UP (ref 0.2–1.2)
BUN SERPL-MCNC: 9 MG/DL — SIGNIFICANT CHANGE UP (ref 7–23)
CALCIUM SERPL-MCNC: 9.2 MG/DL — SIGNIFICANT CHANGE UP (ref 8.5–10.1)
CHLORIDE SERPL-SCNC: 104 MMOL/L — SIGNIFICANT CHANGE UP (ref 96–108)
CO2 SERPL-SCNC: 24 MMOL/L — SIGNIFICANT CHANGE UP (ref 22–31)
CREAT SERPL-MCNC: 0.72 MG/DL — SIGNIFICANT CHANGE UP (ref 0.5–1.3)
D DIMER BLD IA.RAPID-MCNC: 669 NG/ML DDU — HIGH
EGFR: 111 ML/MIN/1.73M2 — SIGNIFICANT CHANGE UP
GLUCOSE BLDC GLUCOMTR-MCNC: 131 MG/DL — HIGH (ref 70–99)
GLUCOSE SERPL-MCNC: 101 MG/DL — HIGH (ref 70–99)
HCT VFR BLD CALC: 42.7 % — SIGNIFICANT CHANGE UP (ref 34.5–45)
HGB BLD-MCNC: 14.5 G/DL — SIGNIFICANT CHANGE UP (ref 11.5–15.5)
M PNEUMO IGM SER-ACNC: 0.82 INDEX — SIGNIFICANT CHANGE UP (ref 0–0.9)
MAGNESIUM SERPL-MCNC: 2 MG/DL — SIGNIFICANT CHANGE UP (ref 1.6–2.6)
MCHC RBC-ENTMCNC: 23.5 PG — LOW (ref 27–34)
MCHC RBC-ENTMCNC: 34 G/DL — SIGNIFICANT CHANGE UP (ref 32–36)
MCV RBC AUTO: 69.1 FL — LOW (ref 80–100)
MYCOPLASMA AG SPEC QL: NEGATIVE — SIGNIFICANT CHANGE UP
NRBC # BLD: 0 /100 WBCS — SIGNIFICANT CHANGE UP (ref 0–0)
PHOSPHATE SERPL-MCNC: 3.6 MG/DL — SIGNIFICANT CHANGE UP (ref 2.5–4.5)
PLATELET # BLD AUTO: 350 K/UL — SIGNIFICANT CHANGE UP (ref 150–400)
POTASSIUM SERPL-MCNC: 3.7 MMOL/L — SIGNIFICANT CHANGE UP (ref 3.5–5.3)
POTASSIUM SERPL-SCNC: 3.7 MMOL/L — SIGNIFICANT CHANGE UP (ref 3.5–5.3)
PROT SERPL-MCNC: 8.9 GM/DL — HIGH (ref 6–8.3)
RAPID RVP RESULT: DETECTED
RBC # BLD: 6.18 M/UL — HIGH (ref 3.8–5.2)
RBC # FLD: 14.4 % — SIGNIFICANT CHANGE UP (ref 10.3–14.5)
RV+EV RNA SPEC QL NAA+PROBE: DETECTED
SARS-COV-2 RNA SPEC QL NAA+PROBE: SIGNIFICANT CHANGE UP
SODIUM SERPL-SCNC: 134 MMOL/L — LOW (ref 135–145)
WBC # BLD: 13.61 K/UL — HIGH (ref 3.8–10.5)
WBC # FLD AUTO: 13.61 K/UL — HIGH (ref 3.8–10.5)

## 2024-10-29 PROCEDURE — 71275 CT ANGIOGRAPHY CHEST: CPT | Mod: 26

## 2024-10-29 PROCEDURE — 99223 1ST HOSP IP/OBS HIGH 75: CPT

## 2024-10-29 PROCEDURE — 74174 CTA ABD&PLVS W/CONTRAST: CPT | Mod: 26

## 2024-10-29 PROCEDURE — 99233 SBSQ HOSP IP/OBS HIGH 50: CPT

## 2024-10-29 PROCEDURE — 93971 EXTREMITY STUDY: CPT | Mod: 26,RT

## 2024-10-29 PROCEDURE — 71045 X-RAY EXAM CHEST 1 VIEW: CPT | Mod: 26

## 2024-10-29 PROCEDURE — 99291 CRITICAL CARE FIRST HOUR: CPT

## 2024-10-29 RX ORDER — MORPHINE SULFATE 30 MG/1
2 TABLET, EXTENDED RELEASE ORAL ONCE
Refills: 0 | Status: DISCONTINUED | OUTPATIENT
Start: 2024-10-29 | End: 2024-10-29

## 2024-10-29 RX ORDER — FUROSEMIDE 40 MG
40 TABLET ORAL ONCE
Refills: 0 | Status: COMPLETED | OUTPATIENT
Start: 2024-10-29 | End: 2024-10-29

## 2024-10-29 RX ORDER — ENOXAPARIN SODIUM 40MG/0.4ML
100 SYRINGE (ML) SUBCUTANEOUS EVERY 12 HOURS
Refills: 0 | Status: DISCONTINUED | OUTPATIENT
Start: 2024-10-29 | End: 2024-10-30

## 2024-10-29 RX ADMIN — MORPHINE SULFATE 2 MILLIGRAM(S): 30 TABLET, EXTENDED RELEASE ORAL at 16:53

## 2024-10-29 RX ADMIN — Medication 100 MILLIGRAM(S): at 17:33

## 2024-10-29 RX ADMIN — Medication 40 MILLIGRAM(S): at 12:46

## 2024-10-29 RX ADMIN — MORPHINE SULFATE 2 MILLIGRAM(S): 30 TABLET, EXTENDED RELEASE ORAL at 14:17

## 2024-10-29 RX ADMIN — DOXYCYCLINE HYCLATE 55 MILLIGRAM(S): 100 TABLET, FILM COATED ORAL at 06:26

## 2024-10-29 NOTE — PROGRESS NOTE ADULT - ASSESSMENT
34 years old female with morbid obesity (BMI 37.8) presented w/ progressively worsened SOB and was admitted w/ acute hypoxic respiratory failure due to multifocal PNA w/ sepsis POA.      Acute hypoxic respiratory failure due to Bilateral pneumonia POA  - CTA chest  showed bilateral widespread GGO and airspace opacification - repeat today again for negative for PE  - c/w Ceftriaxone and Doxy  - NGTD on blood culture   - RVP negative, strep pneumo Ag negative  - follow up urine legionella & mycoplasma IgM  - c/w Duoneb PRN and wean off O2  - wean off oxygen  - pulm note read and appreciated     Nonocclusive thrombus within the IVC on CTA on 10/29  - start therapeutic Lovenox  - will need outpatient hypercoagulability work up     Prophylaxis:  DVT: Lovenox  GI: PO diet

## 2024-10-29 NOTE — RAPID RESPONSE TEAM SUMMARY - NSADDTLFINDINGSRRT_GEN_ALL_CORE
Vitals: RRT Sheet  Lung: decreased breath sounds b/l base  CV: RSR Vitals: RRT Sheet  Lung: decreased breath sounds b/l base  CV: RSR  Abdomen: +BS, Soft, ND, mild tenderness to L.LQ  LE: +right calf tenderness Vitals: RRT Sheet  Lung: decreased breath sounds b/l base  CV: RSR  Abdomen: +BS, Soft, ND, +pain on palpation to L.LQ  LE: +right calf tenderness

## 2024-10-29 NOTE — CONSULT NOTE ADULT - ASSESSMENT
34 year old female denies PHX admitted to medicine with multifocal PNA and acute hypoxemic respiratory failure. Flu/COVID negative. CTA chest with no PE. CTA chest with Bilateral widespread GGO and airspace opacification negative for PE. Pulmonary consulted for continued hypoxemia.      Recommendations:     - Acute hypoxemic respiratory failure likely in setting of multifocal PNA  - CTA chest with diffuse GGO, negative for PE  - Currently on 1-2L NC with SpO2 92% at rest. Room air SpO2 85%. Ambulatory SpO2 on room air 78%. Continue to titrate FiO2 as tolerates for goal SpO2 > 90%.   - Would continue with ceftriaxone and doxy to complete 5 day course.   - F/U blood cultures.   - Procal ordered with am labs F/U result  - RVP, Strep, Legionella Mycoplasma negative  - Patient also reports non descriptive symptoms of intermittent SOB for approx 20 years, along with some type of respiratory issue within most members of her family that make them feel SOB. Possibly autoimmune related ILD?  - Autoimmune workup with DSDNA, RF factor, SANJEEV, Scleroderma antibody and Sjogren's antibody ordered with am labs for workup.   - Will need OP pulmonary follow up and repeat CT chest imaging in approx 6 weeks after ABX treatment to eval for resolution.   - Patient requesting to be DC'd 2/2 / social issues. Discussed with patient safety issues and remaining in hospital for time being for continued treatment. If leaving hospital will need home supplemental 02.   - Plan discussed with Pulm attending Dr. Morales, Medicine FERNANDO Alexander. Discussed with Patient at bedside.

## 2024-10-29 NOTE — CHART NOTE - NSCHARTNOTEFT_GEN_A_CORE
S) 34 years old female with no significant medical history present to ED with complain of progressively worsened SOB for 5 days. Reported sore throat and chest congestion. No fever or chills. No known sick contact.   Tachycardic, hypoxic to 89% at RA, improved with NC. WBC 11.2, plt 346, Cr 0.72, hsTnT 3, proBNP 44. Flu/COVID negative. CTA chest with no PE. Bilateral widespread GGO and airspace opacitifation.  Strep, Legionella Neg, Blood cultures negatitve.   Seen today for RRT after getting hypoxic with ambulation.   Pulm consult appreciated.   Patient feels much better, abd. pain resolved, on Nasal o2 3 liters.     Med:  acetaminophen     Tablet .. 650 milliGRAM(s) Oral every 6 hours PRN  albuterol/ipratropium for Nebulization 3 milliLiter(s) Nebulizer every 6 hours PRN  cefTRIAXone   IVPB 1000 milliGRAM(s) IV Intermittent every 24 hours  enoxaparin Injectable 100 milliGRAM(s) SubCutaneous every 12 hours  melatonin 3 milliGRAM(s) Oral at bedtime PRN    T(C): 36.7 (10-29-24 @ 17:23), Max: 36.9 (10-29-24 @ 04:55)  HR: 88 (10-29-24 @ 17:23) (80 - 88)  BP: 116/81 (10-29-24 @ 17:23) (100/66 - 116/81)  RR: 20 (10-29-24 @ 17:23) (16 - 20)  SpO2: 99% (10-29-24 @ 17:23) (88% - 99%)    PHYSICAL EXAM:  GENERAL: NAD, well-groomed, well-developed  HEAD:  Atraumatic, Normocephalic  EYES: EOMI, PERRLA, conjunctiva and sclera clear  ENMT: No tonsillar erythema, exudates, or enlargement; Moist mucous membranes  NECK: Supple, No JVD, Normal thyroid  HEART: Regular rate and rhythm; No murmurs, rubs, or gallops  RESPIRATORY: bilateral basilar crackles  ABDOMEN: Soft, Nontender, Nondistended; Bowel sounds present  NEUROLOGY: A&Ox3, nonfocal, moving all extremities  EXTREMITIES:  2+ Peripheral Pulses, No clubbing, cyanosis, or edema  SKIN: warm, dry, normal color, no rash or abnormal lesions    10-29    134[L]  |  104  |  9   ----------------------------<  101[H]  3.7   |  24  |  0.72    Ca    9.2      29 Oct 2024 07:00  Phos  3.6     10-29  Mg     2.0     10-29    TPro  8.9[H]  /  Alb  3.5  /  TBili  0.6  /  DBili  0.1  /  AST  23  /  ALT  22  /  AlkPhos  74  10-29                          14.5   13.61 )-----------( 350      ( 29 Oct 2024 07:00 )             42.7     LIVER FUNCTIONS - ( 29 Oct 2024 15:00 )  Alb: 3.5 g/dL / Pro: 8.9 gm/dL / ALK PHOS: 74 U/L / ALT: 22 U/L / AST: 23 U/L / GGT: x           Chest CTA  No pulmonary embolism, bilateral baslilar infiltrates.                    A/P  34 year old female denies PHX admitted to medicine with multifocal PNA and acute hypoxemic respiratory failure. Flu/COVID negative. CTA chest with no PE. CTA chest with Bilateral widespread GGO and airspace opacification negative for PE.  Family hx of lung dx,   Indices in Thalessemia range, no prior work up.   -Autoimmune work up ordered  -Repeat Respiratory PCR, PCJ sputum PCR, LDH, Repeat CBC with DIff, BMP  -Hemoglobin electrophoresis as outpatient  -Continue on home oxygen  -Continue therapeutic Lovenox. S) 34 years old female with no significant medical history present to ED with complain of progressively worsened SOB for 5 days. Reported sore throat and chest congestion. No fever or chills. No known sick contact.   Tachycardic, hypoxic to 89% at RA, improved with NC. WBC 11.2, plt 346, Cr 0.72, hsTnT 3, proBNP 44. Flu/COVID negative. CTA chest with no PE. Bilateral widespread GGO and airspace opacitifation.  Strep, Legionella Neg, Blood cultures negatitve.   Seen today for RRT after getting hypoxic with ambulation.   Pulm consult appreciated.   Patient feels much better, abd. pain resolved, on Nasal o2 3 liters.     Med:  acetaminophen     Tablet .. 650 milliGRAM(s) Oral every 6 hours PRN  albuterol/ipratropium for Nebulization 3 milliLiter(s) Nebulizer every 6 hours PRN  cefTRIAXone   IVPB 1000 milliGRAM(s) IV Intermittent every 24 hours  enoxaparin Injectable 100 milliGRAM(s) SubCutaneous every 12 hours  melatonin 3 milliGRAM(s) Oral at bedtime PRN    T(C): 36.7 (10-29-24 @ 17:23), Max: 36.9 (10-29-24 @ 04:55)  HR: 88 (10-29-24 @ 17:23) (80 - 88)  BP: 116/81 (10-29-24 @ 17:23) (100/66 - 116/81)  RR: 20 (10-29-24 @ 17:23) (16 - 20)  SpO2: 99% (10-29-24 @ 17:23) (88% - 99%)    PHYSICAL EXAM:  GENERAL: NAD, well-groomed, well-developed  HEAD:  Atraumatic, Normocephalic  EYES: EOMI, PERRLA, conjunctiva and sclera clear  ENMT: No tonsillar erythema, exudates, or enlargement; Moist mucous membranes  NECK: Supple, No JVD, Normal thyroid  HEART: Regular rate and rhythm; No murmurs, rubs, or gallops  RESPIRATORY: bilateral basilar crackles  ABDOMEN: Soft, Nontender, Nondistended; Bowel sounds present  NEUROLOGY: A&Ox3, nonfocal, moving all extremities  EXTREMITIES:  2+ Peripheral Pulses, No clubbing, cyanosis, or edema  SKIN: warm, dry, normal color, no rash or abnormal lesions    10-29    134[L]  |  104  |  9   ----------------------------<  101[H]  3.7   |  24  |  0.72    Ca    9.2      29 Oct 2024 07:00  Phos  3.6     10-29  Mg     2.0     10-29    TPro  8.9[H]  /  Alb  3.5  /  TBili  0.6  /  DBili  0.1  /  AST  23  /  ALT  22  /  AlkPhos  74  10-29                          14.5   13.61 )-----------( 350      ( 29 Oct 2024 07:00 )             42.7     LIVER FUNCTIONS - ( 29 Oct 2024 15:00 )  Alb: 3.5 g/dL / Pro: 8.9 gm/dL / ALK PHOS: 74 U/L / ALT: 22 U/L / AST: 23 U/L / GGT: x           Abd CTA  There is nonocclusive thrombus within the IVC extending from the level of   the renal veins to the level of the proximal right common femoral vein.    Bibasilar atelectasis. Superimposed pneumonia cannot be excluded.   Dedicated CTA of the chest is recommended to evaluate for PE.      Chest CTA  No pulmonary embolism, bilateral baslilar infiltrates.     A/P  34 year old female denies PHX admitted to medicine with multifocal PNA and acute hypoxemic respiratory failure. Flu/COVID negative. CTA chest with no PE. CTA chest with Bilateral widespread GGO and airspace opacification negative for PE.  Family hx of lung dx,   Indices in Thalessemia range, no prior work up.  CTA of Abd with IVC thrombus extending to Right Femoral vein  -Autoimmune work up ordered  -Repeat Respiratory PCR, PCJ sputum PCR, LDH, Repeat CBC with DIff, BMP  -Hemoglobin electrophoresis as outpatient  -Continue on home oxygen  -Continue therapeutic Lovenox.

## 2024-10-29 NOTE — PROGRESS NOTE ADULT - SUBJECTIVE AND OBJECTIVE BOX
34 years old female with morbid obesity (BMI 37.8) presented w/ progressively worsened SOB and was admitted w/ acute hypoxic respiratory failure due to multifocal PNA w/ sepsis POA. She is lying in bed in NAD.      MEDICATIONS  (STANDING):  cefTRIAXone   IVPB 1000 milliGRAM(s) IV Intermittent every 24 hours  enoxaparin Injectable 100 milliGRAM(s) SubCutaneous every 12 hours    MEDICATIONS  (PRN):  acetaminophen     Tablet .. 650 milliGRAM(s) Oral every 6 hours PRN Temp greater or equal to 38C (100.4F), Mild Pain (1 - 3), Moderate Pain (4 - 6)  albuterol/ipratropium for Nebulization 3 milliLiter(s) Nebulizer every 6 hours PRN Shortness of Breath and/or Wheezing  melatonin 3 milliGRAM(s) Oral at bedtime PRN Insomnia      Allergies    No Known Allergies    Intolerances    Zithromax IV (Other (Mild))      Vital Signs Last 24 Hrs  T(C): 36.7 (29 Oct 2024 17:23), Max: 36.9 (29 Oct 2024 04:55)  T(F): 98.1 (29 Oct 2024 17:23), Max: 98.4 (29 Oct 2024 04:55)  HR: 88 (29 Oct 2024 17:23) (80 - 88)  BP: 116/81 (29 Oct 2024 17:23) (100/66 - 116/81)  BP(mean): --  RR: 20 (29 Oct 2024 17:23) (16 - 20)  SpO2: 99% (29 Oct 2024 17:23) (88% - 99%)    Parameters below as of 29 Oct 2024 17:23  Patient On (Oxygen Delivery Method): nasal cannula  O2 Flow (L/min): 3      PHYSICAL EXAM:  GENERAL: NAD   HEAD:  Atraumatic, Normocephalic  EYES: EOMI, PERRLA   NECK: Supple   NERVOUS SYSTEM:  Alert    CHEST/LUNG: Clear to auscultation bilaterally; No rales, rhonchi, wheezing, or rubs  HEART: Regular rate and rhythm; No murmurs, rubs, or gallops  ABDOMEN: Soft, Nontender, Nondistended; Bowel sounds present  EXTREMITIES: No clubbing, cyanosis, or edema    LABS:                        14.5   13.61 )-----------( 350      ( 29 Oct 2024 07:00 )             42.7     10-29    134[L]  |  104  |  9   ----------------------------<  101[H]  3.7   |  24  |  0.72    Ca    9.2      29 Oct 2024 07:00  Phos  3.6     10-29  Mg     2.0     10-29    TPro  8.9[H]  /  Alb  3.5  /  TBili  0.6  /  DBili  0.1  /  AST  23  /  ALT  22  /  AlkPhos  74  10-29      Urinalysis Basic - ( 29 Oct 2024 07:00 )    Color: x / Appearance: x / SG: x / pH: x  Gluc: 101 mg/dL / Ketone: x  / Bili: x / Urobili: x   Blood: x / Protein: x / Nitrite: x   Leuk Esterase: x / RBC: x / WBC x   Sq Epi: x / Non Sq Epi: x / Bacteria: x      CAPILLARY BLOOD GLUCOSE      POCT Blood Glucose.: 131 mg/dL (29 Oct 2024 14:03)      Culture - Blood (collected 25 Oct 2024 15:56)  Source: .Blood BLOOD  Preliminary Report (28 Oct 2024 23:01):    No growth at 72 Hours    Culture - Blood (collected 25 Oct 2024 15:56)  Source: .Blood BLOOD  Preliminary Report (28 Oct 2024 23:01):    No growth at 72 Hours      RADIOLOGY & ADDITIONAL TESTS:    10-28-24 @ 07:01  -  10-29-24 @ 07:00  --------------------------------------------------------  IN:    Oral Fluid: 420 mL  Total IN: 420 mL    OUT:  Total OUT: 0 mL    Total NET: 420 mL

## 2024-10-29 NOTE — CONSULT NOTE ADULT - SUBJECTIVE AND OBJECTIVE BOX
HPI:  34 years old female with no significant medical history present to ED with complain of progressively worsened SOB for 5 days. Reported sore throat and chest congestion. No fever or chills. No known sick contact.   Tachycardic, hypoxic to 89% at RA, improved with NC. WBC 11.2, plt 346, Cr 0.72, hsTnT 3, proBNP 44. Flu/COVID negative. CTA chest with no PE. Bilateral widespread GGO and airspace opacitifation.     SH: no toxic habits (25 Oct 2024 16:31)      PAST MEDICAL & SURGICAL HISTORY:  No pertinent past medical history      No significant past surgical history          FAMILY HISTORY:      SOCIAL HISTORY:  Smoking: denies  EtOH Use: denies  Marital Status:   Occupation:   Exposures: cleaning chemicals  Recent Travel: john july 2024     Allergies    No Known Allergies    Intolerances    Zithromax IV (Other (Mild))      HOME MEDICATIONS:    REVIEW OF SYSTEMS:  Constitutional: No fevers or chills. No weight loss. No fatigue or generalised malaise.  Eyes: No itching or discharge from the eyes  ENT: No ear pain. No ear discharge. No nasal congestion. No post nasal drip. No epistaxis. No throat pain. No sore throat. No difficulty swallowing.   CV: No chest pain. No palpitations. No lightheadedness or dizziness.   Resp: + dyspnea at rest. + dyspnea on exertion. No orthopnea. No wheezing. No cough. No stridor. No sputum production. No chest pain with respiration.  GI: No nausea. No vomiting. No diarrhea.  MSK: No joint pain or pain in any extremities  Integumentary: No skin lesions. No pedal edema.  Neurological: No gross motor weakness. No sensory changes.    [ ] All other systems negative  [ ] Unable to assess ROS because ________    OBJECTIVE:  Vital Signs Last 24 Hrs  T(C): 36.8 (29 Oct 2024 10:30), Max: 36.9 (28 Oct 2024 15:55)  T(F): 98.2 (29 Oct 2024 10:30), Max: 98.4 (28 Oct 2024 15:55)  HR: 83 (29 Oct 2024 10:30) (80 - 88)  BP: 112/81 (29 Oct 2024 10:30) (100/66 - 112/81)  BP(mean): --  ABP: --  ABP(mean): --  RR: 16 (29 Oct 2024 10:30) (16 - 19)  SpO2: 98% (29 Oct 2024 12:16) (86% - 98%)    O2 Parameters below as of 29 Oct 2024 12:16  Patient On (Oxygen Delivery Method): nasal cannula  O2 Flow (L/min): 2            10-28 @ 07:01  -  10-29 @ 07:00  --------------------------------------------------------  IN: 420 mL / OUT: 0 mL / NET: 420 mL      CAPILLARY BLOOD GLUCOSE          PHYSICAL EXAM:  General: Awake, alert, oriented X 3.   HEENT: Atraumatic, normocephalic. PERRL, MMM  Neck: No JVD.  Respiratory: Mild Rhonchi B/L, no wheeze  Cardiovascular: S1 S2 normal. No murmurs, rubs or gallops.   Abdomen: Soft, non-tender, non-distended. +bs  Extremities: Warm to touch. Peripheral pulse palpable. No pedal edema.   Skin: No rashes or skin lesions  Neurological: Non-focal  Psychiatry: Appropriate mood and affect.    HOSPITAL MEDICATIONS:  MEDICATIONS  (STANDING):  cefTRIAXone   IVPB 1000 milliGRAM(s) IV Intermittent every 24 hours  enoxaparin Injectable 40 milliGRAM(s) SubCutaneous every 24 hours    MEDICATIONS  (PRN):  acetaminophen     Tablet .. 650 milliGRAM(s) Oral every 6 hours PRN Temp greater or equal to 38C (100.4F), Mild Pain (1 - 3), Moderate Pain (4 - 6)  albuterol/ipratropium for Nebulization 3 milliLiter(s) Nebulizer every 6 hours PRN Shortness of Breath and/or Wheezing  melatonin 3 milliGRAM(s) Oral at bedtime PRN Insomnia      LABS:                        14.5   13.61 )-----------( 350      ( 29 Oct 2024 07:00 )             42.7     10-29    134[L]  |  104  |  9   ----------------------------<  101[H]  3.7   |  24  |  0.72    Ca    9.2      29 Oct 2024 07:00  Phos  3.6     10-29  Mg     2.0     10-29        Urinalysis Basic - ( 29 Oct 2024 07:00 )    Color: x / Appearance: x / SG: x / pH: x  Gluc: 101 mg/dL / Ketone: x  / Bili: x / Urobili: x   Blood: x / Protein: x / Nitrite: x   Leuk Esterase: x / RBC: x / WBC x   Sq Epi: x / Non Sq Epi: x / Bacteria: x    MICROBIOLOGY:     Culture - Blood (10.25.24 @ 15:56)    Specimen Source: .Blood BLOOD   Culture Results:   No growth at 72 Hours    Culture - Blood (10.25.24 @ 15:56)    Specimen Source: .Blood BLOOD   Culture Results:   No growth at 72 Hours    Respiratory Viral Panel with COVID-19 by SELENA (10.25.24 @ 15:56)    Rapid RVP Result: NotDete   SARS-CoV-2: NotDete: This Respiratory Panel uses polymerase chain reaction (PCR) to detect for  adenovirus; coronavirus (HKU1, NL63, 229E, OC43); human metapneumovirus  (hMPV); human enterovirus/rhinovirus (Entero/RV); influenza A; influenza  A/H1; influenza A/H3; influenza A/H1-2009; influenza B; parainfluenza  viruses 1, 2, 3, 4; respiratory syncytial virus; Mycoplasma pneumoniae;  Chlamydophila pneumoniae; and SARS-CoV-2.    Streptococcus pneumoniae Ag, Ur (10.26.24 @ 09:15)    Streptococcus pneumoniae Ag, Ur Result: Negative    Legionella pneumophila Antigen, Urine (10.26.24 @ 09:15)    Legionella Antigen, Urine: Negative    Mycoplasma Pneumoniae IgM Antibody (10.26.24 @ 06:40)    Mycoplasma IgM AB: 0.82 Index   Mycoplasma: Negative      RADIOLOGY:    < from: CT Angio Chest PE Protocol w/ IV Cont (10.25.24 @ 14:55) >  FINDINGS:    LUNGS AND LARGE AIRWAYS: Patent central airways. Low lung volumes.   Bilateral widespread the GE areas of groundglass and airspace parenchymal   opacification may reflect pulmonary edema and/or infection. Recommend   close laboratory clinical correlation. Scattered small cystic areas   throughout the pulmonary parenchyma.  PLEURA: No pleural effusion.  VESSELS: No pulmonary emboli. Nonaneurysmal thoracic aorta  HEART: Heart size is normal. No pericardial effusion.  MEDIASTINUM AND CLAUDE: No lymphadenopathy.  CHEST WALL AND LOWER NECK: Within normal limits.  VISUALIZED UPPER ABDOMEN: Within normal limits.  BONES: Within normal limits.    IMPRESSION:  No pulmonary emboli  Low lung volumes with extensive bilateral groundglass and airspace   opacification may represent pulmonary edema and/or infection.  Scattered cystic areas throughout the pulmonary parenchyma      --- End of Report ---    < end of copied text >

## 2024-10-29 NOTE — RAPID RESPONSE TEAM SUMMARY - NSSITUATIONBACKGROUNDRRT_GEN_ALL_CORE
RRT called by nursing team for respiratory distress.  Patient walked back from bathroom and became acutely SOB, +dizzy felt like passing out.  Currently c/o L.LQ abdominal pain and right LE calf pain.  Vitals performed, pulse OX 90% 1L NC, vitals otherwise unremarkable.     34 years old female with morbid obesity (BMI 37.8) presented w/ progressively worsened SOB and was admitted w/ acute hypoxic respiratory failure due to multifocal PNA w/ sepsis POA.

## 2024-10-29 NOTE — RAPID RESPONSE TEAM SUMMARY - NSMEDICATIONSRRT_GEN_ALL_CORE
- Morphine 2mg IVP x stat  - LE Doppler x stat  - CXR x stat - Morphine 2mg IVP x stat  - LE Doppler x stat r/o DVT  - CXR x stat  - c/w duoneb TX PRN  - c/w oxygen 2L - Morphine 2mg IVP x stat  - LE Doppler x stat r/o DVT  - CXR x stat  - c/w duoneb TX PRN  - c/w oxygen 2L    Addendum 10/29/24 2:35pm  - Patient now c/o diffusing abdominal pain, case d/w Dr. Harmon will get CT and CTA Abdomen w/ IV contraast - Morphine 2mg IVP x stat  - LE Doppler x stat r/o DVT  - CXR x stat  - c/w duoneb TX PRN  - c/w oxygen 2L    Addendum 10/29/24 2:35pm  - Patient now c/o diffuse abdominal pain, case d/w Dr. Harmon will get CTA Abdomen w/ IV contraast

## 2024-10-30 ENCOUNTER — TRANSCRIPTION ENCOUNTER (OUTPATIENT)
Age: 34
End: 2024-10-30

## 2024-10-30 VITALS — RESPIRATION RATE: 17 BRPM | OXYGEN SATURATION: 94 %

## 2024-10-30 LAB
ANION GAP SERPL CALC-SCNC: 11 MMOL/L — SIGNIFICANT CHANGE UP (ref 5–17)
BASOPHILS # BLD AUTO: 0.08 K/UL — SIGNIFICANT CHANGE UP (ref 0–0.2)
BASOPHILS NFR BLD AUTO: 0.6 % — SIGNIFICANT CHANGE UP (ref 0–2)
BUN SERPL-MCNC: 10 MG/DL — SIGNIFICANT CHANGE UP (ref 7–23)
CALCIUM SERPL-MCNC: 9.5 MG/DL — SIGNIFICANT CHANGE UP (ref 8.5–10.1)
CHLORIDE SERPL-SCNC: 100 MMOL/L — SIGNIFICANT CHANGE UP (ref 96–108)
CO2 SERPL-SCNC: 24 MMOL/L — SIGNIFICANT CHANGE UP (ref 22–31)
CREAT SERPL-MCNC: 0.74 MG/DL — SIGNIFICANT CHANGE UP (ref 0.5–1.3)
CULTURE RESULTS: SIGNIFICANT CHANGE UP
CULTURE RESULTS: SIGNIFICANT CHANGE UP
DSDNA AB FLD-ACNC: <0.2 AI — SIGNIFICANT CHANGE UP
DSDNA AB SER-ACNC: <1 IU/ML — SIGNIFICANT CHANGE UP
EGFR: 107 ML/MIN/1.73M2 — SIGNIFICANT CHANGE UP
ENA SCL70 AB SER-ACNC: <0.2 AI — SIGNIFICANT CHANGE UP
ENA SS-A AB FLD IA-ACNC: <0.2 AI — SIGNIFICANT CHANGE UP
EOSINOPHIL # BLD AUTO: 0.48 K/UL — SIGNIFICANT CHANGE UP (ref 0–0.5)
EOSINOPHIL NFR BLD AUTO: 3.7 % — SIGNIFICANT CHANGE UP (ref 0–6)
GLUCOSE SERPL-MCNC: 88 MG/DL — SIGNIFICANT CHANGE UP (ref 70–99)
HCT VFR BLD CALC: 42.8 % — SIGNIFICANT CHANGE UP (ref 34.5–45)
HGB BLD-MCNC: 14.9 G/DL — SIGNIFICANT CHANGE UP (ref 11.5–15.5)
IMM GRANULOCYTES NFR BLD AUTO: 0.6 % — SIGNIFICANT CHANGE UP (ref 0–0.9)
LDH SERPL L TO P-CCNC: 266 U/L — HIGH (ref 50–242)
LYMPHOCYTES # BLD AUTO: 24.8 % — SIGNIFICANT CHANGE UP (ref 13–44)
LYMPHOCYTES # BLD AUTO: 3.23 K/UL — SIGNIFICANT CHANGE UP (ref 1–3.3)
MAGNESIUM SERPL-MCNC: 2.2 MG/DL — SIGNIFICANT CHANGE UP (ref 1.6–2.6)
MCHC RBC-ENTMCNC: 23.6 PG — LOW (ref 27–34)
MCHC RBC-ENTMCNC: 34.8 G/DL — SIGNIFICANT CHANGE UP (ref 32–36)
MCV RBC AUTO: 67.7 FL — LOW (ref 80–100)
MONOCYTES # BLD AUTO: 0.97 K/UL — HIGH (ref 0–0.9)
MONOCYTES NFR BLD AUTO: 7.5 % — SIGNIFICANT CHANGE UP (ref 2–14)
NEUTROPHILS # BLD AUTO: 8.17 K/UL — HIGH (ref 1.8–7.4)
NEUTROPHILS NFR BLD AUTO: 62.8 % — SIGNIFICANT CHANGE UP (ref 43–77)
NRBC # BLD: 0 /100 WBCS — SIGNIFICANT CHANGE UP (ref 0–0)
PHOSPHATE SERPL-MCNC: 4.1 MG/DL — SIGNIFICANT CHANGE UP (ref 2.5–4.5)
PLATELET # BLD AUTO: 363 K/UL — SIGNIFICANT CHANGE UP (ref 150–400)
POTASSIUM SERPL-MCNC: 3.8 MMOL/L — SIGNIFICANT CHANGE UP (ref 3.5–5.3)
POTASSIUM SERPL-SCNC: 3.8 MMOL/L — SIGNIFICANT CHANGE UP (ref 3.5–5.3)
PROCALCITONIN SERPL-MCNC: 0.05 NG/ML — SIGNIFICANT CHANGE UP (ref 0.02–0.1)
RBC # BLD: 6.32 M/UL — HIGH (ref 3.8–5.2)
RBC # FLD: 14.5 % — SIGNIFICANT CHANGE UP (ref 10.3–14.5)
RHEUMATOID FACT SERPL-ACNC: <10 IU/ML — SIGNIFICANT CHANGE UP (ref 0–13)
SODIUM SERPL-SCNC: 135 MMOL/L — SIGNIFICANT CHANGE UP (ref 135–145)
SPECIMEN SOURCE: SIGNIFICANT CHANGE UP
SPECIMEN SOURCE: SIGNIFICANT CHANGE UP
WBC # BLD: 13.01 K/UL — HIGH (ref 3.8–10.5)
WBC # FLD AUTO: 13.01 K/UL — HIGH (ref 3.8–10.5)

## 2024-10-30 PROCEDURE — 99239 HOSP IP/OBS DSCHRG MGMT >30: CPT

## 2024-10-30 PROCEDURE — 99233 SBSQ HOSP IP/OBS HIGH 50: CPT

## 2024-10-30 RX ORDER — ALBUTEROL 90 MCG
2 AEROSOL (GRAM) INHALATION
Qty: 1 | Refills: 0
Start: 2024-10-30

## 2024-10-30 RX ORDER — APIXABAN 5 MG/1
2 TABLET, FILM COATED ORAL
Qty: 60 | Refills: 0
Start: 2024-10-30 | End: 2024-11-28

## 2024-10-30 RX ORDER — CEFPODOXIME PROXETIL 200 MG/1
1 TABLET, FILM COATED ORAL
Qty: 6 | Refills: 0
Start: 2024-10-30 | End: 2024-11-01

## 2024-10-30 RX ADMIN — Medication 100 MILLIGRAM(S): at 05:24

## 2024-10-30 NOTE — CHART NOTE - NSCHARTNOTEFT_GEN_A_CORE
Work Letter     To Whom It May Concern,    Casa Carter was admitted at [facility] on 10/25/2024 and  was discharged from [facility] on 10/30/2024. Patient may return to work with further clearance from PCP if required.   Any further questions or concerns please use contact info below.      Judi Morales PA-C  Internal Medicine  [address] [place] [id number] [phone] Work Letter     To Whom It May Concern,    Casa Carter was admitted at [facility] on 10/25/2024 and  was discharged from [facility] on 10/30/2024. Patient may return to work Monday 11/4/24 or with further clearance from PCP if required.   Any further questions or concerns please use contact info below.      Judi Morales PA-C  Internal Medicine  [address] [place] [id number]  [phone]

## 2024-10-30 NOTE — DISCHARGE NOTE NURSING/CASE MANAGEMENT/SOCIAL WORK - PATIENT PORTAL LINK FT
You can access the FollowMyHealth Patient Portal offered by [facility] by registering at the following website: [email or web address]. By joining [facility]’s FollowMyHealth portal, you will also be able to view your health information using other applications (apps) compatible with our system.

## 2024-10-30 NOTE — DISCHARGE NOTE PROVIDER - NSDCFUADDAPPT_GEN_ALL_CORE_FT
APPTS ARE READY TO BE MADE: [X] YES    Best Family or Patient Contact (if needed):    Additional Information about above appointments (if needed):    1: PCP  2:   3:     Other comments or requests:    APPTS ARE READY TO BE MADE: [X] YES    Best Family or Patient Contact (if needed):    Additional Information about above appointments (if needed):    1: PCP  2:   3:     Other comments or requests:   Patient informed us they already have secured a follow up appointment which is not visible on [facility]. PCP on 11/6  Patient informed us they already have secured a follow up appointment which was not scheduled by our team. Home Pulm Dr. Lisker, Gita on 11/1 at 10:30am   Provided patient with provider referral information, however patient prefers to schedule the appointments on their own

## 2024-10-30 NOTE — PROGRESS NOTE ADULT - ASSESSMENT
34 year old female denies PHX admitted to medicine with multifocal PNA and acute hypoxemic respiratory failure. Flu/COVID negative. CTA chest with no PE. CTA chest with Bilateral widespread GGO and airspace opacification negative for PE. Pulmonary consulted for continued hypoxemia.      Recommendations:     - Acute hypoxemic respiratory failure likely in setting of multifocal PNA  - CTA chest with diffuse GGO, negative for PE  - Currently on 1-2L NC with SpO2 92% at rest. Room air SpO2 85%. Ambulatory SpO2 on room air 78%. Continue to titrate FiO2 as tolerates for goal SpO2 > 90%.   - Would continue with ceftriaxone and doxy to complete 5 day course.   - F/U blood cultures.   - Procal ordered with am labs F/U result  - RVP, Strep, Legionella Mycoplasma negative  - Patient also reports non descriptive symptoms of intermittent SOB for approx 20 years, along with some type of respiratory issue within most members of her family that make them feel SOB. Possibly autoimmune related ILD?  - Autoimmune workup with DSDNA, RF factor, SANJEEV, Scleroderma antibody and Sjogren's antibody ordered with am labs for workup.   - Will need OP pulmonary follow up and repeat CT chest imaging in approx 6 weeks after ABX treatment to eval for resolution.   - Patient requesting to be DC'd 2/2 / social issues. Discussed with patient safety issues and remaining in hospital for time being for continued treatment. If leaving hospital will need home supplemental 02.     note incomplete 34 year old female denies PHX admitted to medicine with multifocal PNA and acute hypoxemic respiratory failure. Flu/COVID negative. CTA chest with no PE. CTA chest with Bilateral widespread GGO and airspace opacification negative for PE. Pulmonary consulted for continued hypoxemia.      Recommendations:     - Acute hypoxemic respiratory failure likely in setting of multifocal PNA  - CTA chest with diffuse GGO, negative for PE  - satting well on RA but dessats when ambulates. Now being d/eddie on home O2 prn  - to complete ceftriaxone and doxy to complete 5 day course.   - F/U blood cultures.   - Procal ordered with am labs F/U result  - RVP, Strep, Legionella Mycoplasma negative  - Patient also reports non descriptive symptoms of intermittent SOB for approx 20 years, along with some type of respiratory issue within most members of her family that make them feel SOB. Possibly autoimmune related ILD?  - Autoimmune workup with DSDNA, RF factor, SANJEEV, Scleroderma antibody and Sjogren's antibody ordered with am labs for workup.   - Will need OP pulmonary follow up and repeat CT chest imaging in approx 6 weeks after ABX treatment to eval for resolution.   - pending d/c today     d/w dr laurent

## 2024-10-30 NOTE — DISCHARGE NOTE PROVIDER - NSDCCPCAREPLAN_GEN_ALL_CORE_FT
PRINCIPAL DISCHARGE DIAGNOSIS  Diagnosis: Multifocal pneumonia  Assessment and Plan of Treatment: You were admitted to the hospital due to pneumonia and found to have an IVC thrombus. You were prescribed anticoagulation medication. Please use your oxygen and take your medication as prescribed. It is recommended that you follow up with your primary care doctor for an outpatient coagulation/autoimmune work up.  Nonocclusive thrombus within the IVC on CTA on 10/29, you will need outpatient pulmonary follow up and repeat CT chest imaging in approx 6 weeks after antibiotic treatment to eval for resolution.      SECONDARY DISCHARGE DIAGNOSES  Diagnosis: Hypoxia  Assessment and Plan of Treatment:

## 2024-10-30 NOTE — DISCHARGE NOTE NURSING/CASE MANAGEMENT/SOCIAL WORK - FINANCIAL ASSISTANCE
[facility] provides services at a reduced cost to those who are determined to be eligible through [facility]’s financial assistance program. Information regarding [facility]’s financial assistance program can be found by going to [email or web address] or by calling [phone].

## 2024-10-30 NOTE — DISCHARGE NOTE PROVIDER - PROVIDER TOKENS
FREE:[LAST:[primary care doctor],PHONE:[(   )    -],FAX:[(   )    -],FOLLOWUP:[1 week]],FREE:[LAST:[pulmonologist],PHONE:[(   )    -],FAX:[(   )    -],FOLLOWUP:[Routine]] FREE:[LAST:[primary care doctor],PHONE:[(   )    -],FAX:[(   )    -],FOLLOWUP:[1 week]],FREE:[LAST:[pulmonologist],PHONE:[(   )    -],FAX:[(   )    -],FOLLOWUP:[Routine]],FREE:[LAST:[[facility]],PHONE:[phone]],FAX:[(   )    -],ADDRESS:[[facility]],FOLLOWUP:[2 weeks]]

## 2024-10-30 NOTE — PROGRESS NOTE ADULT - SUBJECTIVE AND OBJECTIVE BOX
INTERVAL HPI/OVERNIGHT EVENTS:    SUBJECTIVE: Patient seen and examined at bedside.     ROS: All negative except as listed above.    VITAL SIGNS:  ICU Vital Signs Last 24 Hrs  T(C): 36.9 (30 Oct 2024 04:40), Max: 37.1 (29 Oct 2024 23:33)  T(F): 98.4 (30 Oct 2024 04:40), Max: 98.7 (29 Oct 2024 23:33)  HR: 88 (30 Oct 2024 04:52) (83 - 93)  BP: 113/73 (30 Oct 2024 04:52) (97/66 - 116/81)  BP(mean): --  ABP: --  ABP(mean): --  RR: 18 (30 Oct 2024 04:40) (16 - 20)  SpO2: 94% (30 Oct 2024 04:40) (88% - 99%)    O2 Parameters below as of 30 Oct 2024 04:40  Patient On (Oxygen Delivery Method): nasal cannula  O2 Flow (L/min): 3        CAPILLARY BLOOD GLUCOSE      POCT Blood Glucose.: 131 mg/dL (29 Oct 2024 14:03)      ECG: reviewed.    PHYSICAL EXAM:    GENERAL: NAD, lying in bed comfortably  HEAD:  Atraumatic, normocephalic  EYES: EOMI, PERRLA, conjunctiva and sclera clear  NECK: Supple, trachea midline, no JVD  HEART: Regular rate and rhythm, no murmurs, rubs, or gallops  LUNGS: Unlabored respirations.  Clear to auscultation bilaterally, no crackles, wheezing, or rhonchi  ABDOMEN: Soft, nontender, nondistended, +BS  EXTREMITIES: 2+ peripheral pulses bilaterally, cap refill<2 secs. No clubbing, cyanosis, or edema  NERVOUS SYSTEM:  A&Ox3, following commands, moving all extremities, no focal deficits   SKIN: No rashes or lesions    MEDICATIONS:  MEDICATIONS  (STANDING):  cefTRIAXone   IVPB 1000 milliGRAM(s) IV Intermittent every 24 hours  enoxaparin Injectable 100 milliGRAM(s) SubCutaneous every 12 hours    MEDICATIONS  (PRN):  acetaminophen     Tablet .. 650 milliGRAM(s) Oral every 6 hours PRN Temp greater or equal to 38C (100.4F), Mild Pain (1 - 3), Moderate Pain (4 - 6)  albuterol/ipratropium for Nebulization 3 milliLiter(s) Nebulizer every 6 hours PRN Shortness of Breath and/or Wheezing  melatonin 3 milliGRAM(s) Oral at bedtime PRN Insomnia      ALLERGIES:  Allergies    No Known Allergies    Intolerances    Zithromax IV (Other (Mild))      LABS:                        14.5   13.61 )-----------( 350      ( 29 Oct 2024 07:00 )             42.7     10-29    134[L]  |  104  |  9   ----------------------------<  101[H]  3.7   |  24  |  0.72    Ca    9.2      29 Oct 2024 07:00  Phos  3.6     10-29  Mg     2.0     10-29    TPro  8.9[H]  /  Alb  3.5  /  TBili  0.6  /  DBili  0.1  /  AST  23  /  ALT  22  /  AlkPhos  74  10-29      Urinalysis Basic - ( 29 Oct 2024 07:00 )    Color: x / Appearance: x / SG: x / pH: x  Gluc: 101 mg/dL / Ketone: x  / Bili: x / Urobili: x   Blood: x / Protein: x / Nitrite: x   Leuk Esterase: x / RBC: x / WBC x   Sq Epi: x / Non Sq Epi: x / Bacteria: x      ABG:      vBG:    Micro:    Culture - Blood (collected 10-25-24 @ 15:56)  Source: .Blood BLOOD  Preliminary Report (10-29-24 @ 23:00):    No growth at 4 days    Culture - Blood (collected 10-25-24 @ 15:56)  Source: .Blood BLOOD  Preliminary Report (10-29-24 @ 23:00):    No growth at 4 days          RADIOLOGY & ADDITIONAL TESTS: Reviewed.   INTERVAL HPI/OVERNIGHT EVENTS: started on A/C for IVC clot     SUBJECTIVE: Patient seen and examined at bedside. satting well on RA and denies any SOB    ROS: All negative except as listed above.    VITAL SIGNS:  ICU Vital Signs Last 24 Hrs  T(C): 36.9 (30 Oct 2024 04:40), Max: 37.1 (29 Oct 2024 23:33)  T(F): 98.4 (30 Oct 2024 04:40), Max: 98.7 (29 Oct 2024 23:33)  HR: 88 (30 Oct 2024 04:52) (83 - 93)  BP: 113/73 (30 Oct 2024 04:52) (97/66 - 116/81)  BP(mean): --  ABP: --  ABP(mean): --  RR: 18 (30 Oct 2024 04:40) (16 - 20)  SpO2: 94% (30 Oct 2024 04:40) (88% - 99%)    O2 Parameters below as of 30 Oct 2024 04:40  Patient On (Oxygen Delivery Method): nasal cannula  O2 Flow (L/min): 3        CAPILLARY BLOOD GLUCOSE      POCT Blood Glucose.: 131 mg/dL (29 Oct 2024 14:03)      ECG: reviewed.    PHYSICAL EXAM:    GENERAL: NAD, lying in bed comfortably  HEAD:  Atraumatic, normocephalic  EYES: EOMI, PERRLA, conjunctiva and sclera clear  NECK: Supple, trachea midline, no JVD  HEART: Regular rate and rhythm, no murmurs, rubs, or gallops  LUNGS: Unlabored respirations.  Clear to auscultation bilaterally, no crackles, wheezing, or rhonchi  ABDOMEN: Soft, nontender, nondistended, +BS  EXTREMITIES: 2+ peripheral pulses bilaterally, cap refill<2 secs. No LE edema   NERVOUS SYSTEM:  A&Ox3, following commands, moving all extremities, no focal deficits       MEDICATIONS:  MEDICATIONS  (STANDING):  cefTRIAXone   IVPB 1000 milliGRAM(s) IV Intermittent every 24 hours  enoxaparin Injectable 100 milliGRAM(s) SubCutaneous every 12 hours    MEDICATIONS  (PRN):  acetaminophen     Tablet .. 650 milliGRAM(s) Oral every 6 hours PRN Temp greater or equal to 38C (100.4F), Mild Pain (1 - 3), Moderate Pain (4 - 6)  albuterol/ipratropium for Nebulization 3 milliLiter(s) Nebulizer every 6 hours PRN Shortness of Breath and/or Wheezing  melatonin 3 milliGRAM(s) Oral at bedtime PRN Insomnia      ALLERGIES:  Allergies    No Known Allergies    Intolerances    Zithromax IV (Other (Mild))      LABS:                        14.5   13.61 )-----------( 350      ( 29 Oct 2024 07:00 )             42.7     10-29    134[L]  |  104  |  9   ----------------------------<  101[H]  3.7   |  24  |  0.72    Ca    9.2      29 Oct 2024 07:00  Phos  3.6     10-29  Mg     2.0     10-29    TPro  8.9[H]  /  Alb  3.5  /  TBili  0.6  /  DBili  0.1  /  AST  23  /  ALT  22  /  AlkPhos  74  10-29      Urinalysis Basic - ( 29 Oct 2024 07:00 )    Color: x / Appearance: x / SG: x / pH: x  Gluc: 101 mg/dL / Ketone: x  / Bili: x / Urobili: x   Blood: x / Protein: x / Nitrite: x   Leuk Esterase: x / RBC: x / WBC x   Sq Epi: x / Non Sq Epi: x / Bacteria: x      ABG:      vBG:    Micro:    Culture - Blood (collected 10-25-24 @ 15:56)  Source: .Blood BLOOD  Preliminary Report (10-29-24 @ 23:00):    No growth at 4 days    Culture - Blood (collected 10-25-24 @ 15:56)  Source: .Blood BLOOD  Preliminary Report (10-29-24 @ 23:00):    No growth at 4 days          RADIOLOGY & ADDITIONAL TESTS: Reviewed.

## 2024-10-30 NOTE — DISCHARGE NOTE PROVIDER - HOSPITAL COURSE
34 years old female with morbid obesity (BMI 37.8) presented w/ progressively worsened SOB and was admitted w/ acute hypoxic respiratory failure due to multifocal PNA w/ sepsis POA.      Acute hypoxic respiratory failure due to Bilateral pneumonia POA  - CTA chest  showed bilateral widespread GGO and airspace opacification - repeat today again for negative for PE  - c/w Ceftriaxone and Doxy  - NGTD on blood culture   - RVP negative, strep pneumo Ag negative  - follow up urine legionella & mycoplasma IgM  - c/w Duoneb PRN and wean off O2  - wean off oxygen  - pulm note read and appreciated     Nonocclusive thrombus within the IVC on CTA on 10/29  - start therapeutic Lovenox  - will need outpatient hypercoagulability work up     Prophylaxis:  DVT: Lovenox  GI: PO diet    Patient medically optimized for discharge with home O2 and outpatient hypercoagulability work up. Patient will need OP pulmonary follow up and repeat CT chest imaging in approx 6 weeks after ABX treatment to eval for resolution. Discussed case with Dr Monique on 10/30/24, agrees with plan.     Total time spent 36 minutes. 34 years old female with morbid obesity (BMI 37.8) presented w/ progressively worsened SOB and was admitted w/ acute hypoxic respiratory failure due to multifocal PNA w/ sepsis POA and IVC thrombosis stable. Patient is medically stable for discharge with outpatient follow up.      Acute hypoxic respiratory failure due to Bilateral pneumonia POA  - CTA chest  showed bilateral widespread GGO and airspace opacification - repeat today again for negative for PE  - c/w Ceftriaxone and Doxy  - NGTD on blood culture   - RVP negative, strep pneumo Ag negative  - follow up urine legionella & mycoplasma IgM  - c/w Duoneb PRN and wean off O2  - wean off oxygen  - pulm note read and appreciated     Nonocclusive thrombus within the IVC on CTA on 10/29  - start therapeutic Lovenox  - will need outpatient hypercoagulability work up     Prophylaxis:  DVT: Lovenox  GI: PO diet    Patient medically optimized for discharge with home O2 and outpatient hypercoagulability work up. Patient will need OP pulmonary follow up and repeat CT chest imaging in approx 6 weeks after ABX treatment to eval for resolution. Discussed case with Dr Monique on 10/30/24, agrees with plan.     Total time spent 36 minutes.

## 2024-10-30 NOTE — DISCHARGE NOTE PROVIDER - ATTENDING DISCHARGE PHYSICAL EXAMINATION:
GENERAL: NAD, lying in bed comfortably  HEAD:  Atraumatic, normocephalic  EYES: EOMI, PERRLA, conjunctiva and sclera clear  NECK: Supple, trachea midline, no JVD  HEART: Regular rate and rhythm, no murmurs, rubs, or gallops  LUNGS: Unlabored respirations.  Clear to auscultation bilaterally, no crackles, wheezing, or rhonchi  ABDOMEN: Soft, nontender, nondistended, +BS  EXTREMITIES: 2+ peripheral pulses bilaterally, cap refill<2 secs. No clubbing, cyanosis, or edema  NERVOUS SYSTEM:  A&Ox3, following commands, moving all extremities, no focal deficits   SKIN: No rashes or lesions

## 2024-10-30 NOTE — PROGRESS NOTE ADULT - REASON FOR ADMISSION
acute respiratory failure with hypoxia, bilateral pneumonia

## 2024-10-30 NOTE — DISCHARGE NOTE PROVIDER - CARE PROVIDER_API CALL
primary care doctor,   Phone: (   )    -  Fax: (   )    -  Follow Up Time: 1 week    pulmonologist,   Phone: (   )    -  Fax: (   )    -  Follow Up Time: Routine   primary care doctor,   Phone: (   )    -  Fax: (   )    -  Follow Up Time: 1 week    pulmonologist,   Phone: (   )    -  Fax: (   )    -  Follow Up Time: Routine    [facility]  Phone: [phone]  Fax: (   )    -  Follow Up Time: 2 weeks

## 2024-10-30 NOTE — DISCHARGE NOTE PROVIDER - NSDCFUSCHEDAPPT_GEN_ALL_CORE_FT
Lisker, Gita N  [facility]  [address]  Scheduled Appointment: 11/01/2024

## 2024-10-30 NOTE — DISCHARGE NOTE PROVIDER - NSDCMRMEDTOKEN_GEN_ALL_CORE_FT
cefpodoxime 200 mg oral tablet: 1 tab(s) orally 2 times a day  Eliquis Starter Pack for Treatment of DVT and PE 5 mg oral tablet: 2 tab(s) orally 2 times a day Take 2 tabs twice a day for 7 days. Then take 1 tab twice a day.  Ventolin HFA 90 mcg/inh inhalation aerosol: 2 puff(s) inhaled every 4 hours as needed for  shortness of breath and/or wheezing

## 2024-10-31 PROBLEM — Z00.00 ENCOUNTER FOR PREVENTIVE HEALTH EXAMINATION: Status: ACTIVE | Noted: 2024-10-31

## 2024-10-31 PROBLEM — Z78.9 OTHER SPECIFIED HEALTH STATUS: Chronic | Status: ACTIVE | Noted: 2024-10-25

## 2024-10-31 LAB — ANA TITR SER: NEGATIVE — SIGNIFICANT CHANGE UP

## 2024-11-01 ENCOUNTER — APPOINTMENT (OUTPATIENT)
Dept: PULMONOLOGY | Facility: CLINIC | Age: 34
End: 2024-11-01
Payer: COMMERCIAL

## 2024-11-01 DIAGNOSIS — J18.9 PNEUMONIA, UNSPECIFIED ORGANISM: ICD-10-CM

## 2024-11-01 DIAGNOSIS — J96.01 ACUTE RESPIRATORY FAILURE WITH HYPOXIA: ICD-10-CM

## 2024-11-01 PROCEDURE — 99496 TRANSJ CARE MGMT HIGH F2F 7D: CPT

## 2024-11-04 DIAGNOSIS — A41.9 SEPSIS, UNSPECIFIED ORGANISM: ICD-10-CM

## 2024-11-04 DIAGNOSIS — I82.220 ACUTE EMBOLISM AND THROMBOSIS OF INFERIOR VENA CAVA: ICD-10-CM

## 2024-11-04 DIAGNOSIS — J18.9 PNEUMONIA, UNSPECIFIED ORGANISM: ICD-10-CM

## 2024-11-04 DIAGNOSIS — E66.812 OBESITY, CLASS 2: ICD-10-CM

## 2024-11-04 DIAGNOSIS — Z11.52 ENCOUNTER FOR SCREENING FOR COVID-19: ICD-10-CM

## 2024-11-04 DIAGNOSIS — I82.411 ACUTE EMBOLISM AND THROMBOSIS OF RIGHT FEMORAL VEIN: ICD-10-CM

## 2024-11-04 DIAGNOSIS — I82.3 EMBOLISM AND THROMBOSIS OF RENAL VEIN: ICD-10-CM

## 2024-11-04 DIAGNOSIS — J96.01 ACUTE RESPIRATORY FAILURE WITH HYPOXIA: ICD-10-CM

## 2024-11-05 ENCOUNTER — APPOINTMENT (OUTPATIENT)
Dept: PULMONOLOGY | Facility: CLINIC | Age: 34
End: 2024-11-05
Payer: COMMERCIAL

## 2024-11-05 ENCOUNTER — LABORATORY RESULT (OUTPATIENT)
Age: 34
End: 2024-11-05

## 2024-11-05 VITALS
WEIGHT: 215.38 LBS | RESPIRATION RATE: 17 BRPM | DIASTOLIC BLOOD PRESSURE: 81 MMHG | SYSTOLIC BLOOD PRESSURE: 122 MMHG | HEART RATE: 93 BPM | TEMPERATURE: 98.1 F | BODY MASS INDEX: 39.63 KG/M2 | OXYGEN SATURATION: 96 % | HEIGHT: 62 IN

## 2024-11-05 DIAGNOSIS — I82.90 ACUTE EMBOLISM AND THROMBOSIS OF UNSPECIFIED VEIN: ICD-10-CM

## 2024-11-05 PROCEDURE — G2211 COMPLEX E/M VISIT ADD ON: CPT | Mod: NC

## 2024-11-05 PROCEDURE — 99215 OFFICE O/P EST HI 40 MIN: CPT

## 2024-11-05 RX ORDER — APIXABAN 5 MG/1
TABLET, FILM COATED ORAL
Refills: 0 | Status: ACTIVE | COMMUNITY

## 2024-11-07 LAB
AT III PPP CHRO-ACNC: 108 %
B2 GLYCOPROT1 IGA SERPL IA-ACNC: <2 U/ML
B2 GLYCOPROT1 IGG SER-ACNC: <1.4 U/ML
B2 GLYCOPROT1 IGM SER-ACNC: <1.5 U/ML
BASOPHILS # BLD AUTO: 0.04 K/UL
BASOPHILS NFR BLD AUTO: 0.4 %
CARDIOLIPIN IGM SER-MCNC: <1.5 MPL U/ML
CARDIOLIPIN IGM SER-MCNC: <1.6 GPL U/ML
EOSINOPHIL # BLD AUTO: 0.29 K/UL
EOSINOPHIL NFR BLD AUTO: 2.7 %
HCT VFR BLD CALC: 41 %
HGB BLD-MCNC: 14.2 G/DL
IMM GRANULOCYTES NFR BLD AUTO: 0.3 %
LYMPHOCYTES # BLD AUTO: 2.58 K/UL
LYMPHOCYTES NFR BLD AUTO: 24.1 %
MAN DIFF?: NORMAL
MCHC RBC-ENTMCNC: 23.9 PG
MCHC RBC-ENTMCNC: 34.6 G/DL
MCV RBC AUTO: 69 FL
MONOCYTES # BLD AUTO: 0.72 K/UL
MONOCYTES NFR BLD AUTO: 6.7 %
NEUTROPHILS # BLD AUTO: 7.03 K/UL
NEUTROPHILS NFR BLD AUTO: 65.8 %
PLATELET # BLD AUTO: 340 K/UL
PROT C PPP CHRO-ACNC: 76 %
RBC # BLD: 5.94 M/UL
RBC # FLD: 15.7 %
WBC # FLD AUTO: 10.69 K/UL

## 2024-11-08 LAB
DNA PLOIDY SPEC FC-IMP: NORMAL
PTR INTERP: NORMAL

## 2024-11-12 ENCOUNTER — NON-APPOINTMENT (OUTPATIENT)
Age: 34
End: 2024-11-12

## 2024-11-12 LAB — PROT S AG ACT/NOR PPP IA: 47 %

## 2025-01-07 ENCOUNTER — APPOINTMENT (OUTPATIENT)
Dept: PULMONOLOGY | Facility: CLINIC | Age: 35
End: 2025-01-07

## 2025-01-27 ENCOUNTER — NON-APPOINTMENT (OUTPATIENT)
Age: 35
End: 2025-01-27